# Patient Record
Sex: MALE | Race: WHITE | Employment: OTHER | URBAN - NONMETROPOLITAN AREA
[De-identification: names, ages, dates, MRNs, and addresses within clinical notes are randomized per-mention and may not be internally consistent; named-entity substitution may affect disease eponyms.]

---

## 2017-04-21 ENCOUNTER — OFFICE VISIT (OUTPATIENT)
Dept: PRIMARY CARE CLINIC | Age: 72
End: 2017-04-21
Payer: MEDICARE

## 2017-04-21 VITALS
WEIGHT: 194 LBS | HEIGHT: 70 IN | TEMPERATURE: 97.7 F | SYSTOLIC BLOOD PRESSURE: 122 MMHG | DIASTOLIC BLOOD PRESSURE: 80 MMHG | BODY MASS INDEX: 27.77 KG/M2 | HEART RATE: 68 BPM | OXYGEN SATURATION: 98 %

## 2017-04-21 DIAGNOSIS — R19.7 DIARRHEA, UNSPECIFIED TYPE: Primary | ICD-10-CM

## 2017-04-21 DIAGNOSIS — R19.7 DIARRHEA, UNSPECIFIED TYPE: ICD-10-CM

## 2017-04-21 LAB
ABSOLUTE EOS #: 0.16 K/UL (ref 0–0.4)
ABSOLUTE LYMPH #: 1.24 K/UL (ref 1–4.8)
ABSOLUTE MONO #: 0.55 K/UL (ref 0.1–1.2)
ALBUMIN SERPL-MCNC: 4 G/DL (ref 3.5–5.2)
ALBUMIN/GLOBULIN RATIO: 1.3 (ref 1–2.5)
ALP BLD-CCNC: 71 U/L (ref 40–129)
ALT SERPL-CCNC: 19 U/L (ref 5–41)
ANION GAP SERPL CALCULATED.3IONS-SCNC: 11 MMOL/L (ref 9–17)
AST SERPL-CCNC: 20 U/L
BASOPHILS # BLD: 1 % (ref 0–2)
BASOPHILS ABSOLUTE: 0.04 K/UL (ref 0–0.2)
BILIRUB SERPL-MCNC: 0.39 MG/DL (ref 0.3–1.2)
BUN BLDV-MCNC: 16 MG/DL (ref 8–23)
BUN/CREAT BLD: 18 (ref 9–20)
CALCIUM SERPL-MCNC: 9 MG/DL (ref 8.6–10.4)
CHLORIDE BLD-SCNC: 102 MMOL/L (ref 98–107)
CO2: 26 MMOL/L (ref 20–31)
CREAT SERPL-MCNC: 0.88 MG/DL (ref 0.7–1.2)
DIFFERENTIAL TYPE: ABNORMAL
EOSINOPHILS RELATIVE PERCENT: 4 % (ref 1–4)
GFR AFRICAN AMERICAN: >60 ML/MIN
GFR NON-AFRICAN AMERICAN: >60 ML/MIN
GFR SERPL CREATININE-BSD FRML MDRD: NORMAL ML/MIN/{1.73_M2}
GFR SERPL CREATININE-BSD FRML MDRD: NORMAL ML/MIN/{1.73_M2}
GLUCOSE BLD-MCNC: 91 MG/DL (ref 70–99)
HCT VFR BLD CALC: 45.3 % (ref 41–53)
HEMOGLOBIN: 14.7 G/DL (ref 13.5–17.5)
LYMPHOCYTES # BLD: 28 % (ref 24–44)
MCH RBC QN AUTO: 22.6 PG (ref 26–34)
MCHC RBC AUTO-ENTMCNC: 32.4 G/DL (ref 31–37)
MCV RBC AUTO: 69.8 FL (ref 80–100)
MONOCYTES # BLD: 12 % (ref 1–7)
PDW BLD-RTO: 16.8 % (ref 11–14.5)
PLATELET # BLD: 170 K/UL (ref 140–450)
PLATELET ESTIMATE: ABNORMAL
PMV BLD AUTO: 7.9 FL (ref 6–12)
POTASSIUM SERPL-SCNC: 3.8 MMOL/L (ref 3.7–5.3)
RBC # BLD: 6.49 M/UL (ref 4.5–5.9)
RBC # BLD: ABNORMAL 10*6/UL
SEG NEUTROPHILS: 55 % (ref 36–66)
SEGMENTED NEUTROPHILS ABSOLUTE COUNT: 2.46 K/UL (ref 1.8–7.7)
SODIUM BLD-SCNC: 139 MMOL/L (ref 135–144)
TOTAL PROTEIN: 7 G/DL (ref 6.4–8.3)
WBC # BLD: 4.4 K/UL (ref 3.5–11)
WBC # BLD: ABNORMAL 10*3/UL

## 2017-04-21 PROCEDURE — 85025 COMPLETE CBC W/AUTO DIFF WBC: CPT | Performed by: NURSE PRACTITIONER

## 2017-04-21 PROCEDURE — 3017F COLORECTAL CA SCREEN DOC REV: CPT | Performed by: NURSE PRACTITIONER

## 2017-04-21 PROCEDURE — 1036F TOBACCO NON-USER: CPT | Performed by: NURSE PRACTITIONER

## 2017-04-21 PROCEDURE — 4040F PNEUMOC VAC/ADMIN/RCVD: CPT | Performed by: NURSE PRACTITIONER

## 2017-04-21 PROCEDURE — 99213 OFFICE O/P EST LOW 20 MIN: CPT | Performed by: NURSE PRACTITIONER

## 2017-04-21 PROCEDURE — 1123F ACP DISCUSS/DSCN MKR DOCD: CPT | Performed by: NURSE PRACTITIONER

## 2017-04-21 PROCEDURE — G8420 CALC BMI NORM PARAMETERS: HCPCS | Performed by: NURSE PRACTITIONER

## 2017-04-21 PROCEDURE — 36415 COLL VENOUS BLD VENIPUNCTURE: CPT | Performed by: NURSE PRACTITIONER

## 2017-04-21 PROCEDURE — 80053 COMPREHEN METABOLIC PANEL: CPT | Performed by: NURSE PRACTITIONER

## 2017-04-21 PROCEDURE — G8427 DOCREV CUR MEDS BY ELIG CLIN: HCPCS | Performed by: NURSE PRACTITIONER

## 2017-04-21 ASSESSMENT — ENCOUNTER SYMPTOMS
FLATUS: 1
RESPIRATORY NEGATIVE: 1
BLOOD IN STOOL: 0
ABDOMINAL PAIN: 0
DIARRHEA: 1
NAUSEA: 1

## 2017-04-24 ENCOUNTER — HOSPITAL ENCOUNTER (OUTPATIENT)
Age: 72
Setting detail: SPECIMEN
Discharge: HOME | End: 2017-04-24
Payer: MEDICARE

## 2017-04-25 LAB
CAMPYLOBACTER PCR: NORMAL
DIRECT EXAM: NORMAL
Lab: NORMAL
SALMONELLA PCR: NORMAL
SHIGATOXIN GENE PCR: NORMAL
SHIGELLA SP PCR: NORMAL
SPECIMEN DESCRIPTION: NORMAL
SPECIMEN: NORMAL
STATUS: NORMAL

## 2017-05-01 DIAGNOSIS — E78.2 MIXED HYPERLIPIDEMIA: ICD-10-CM

## 2017-05-04 LAB
CULTURE: NORMAL
Lab: NORMAL
SPECIMEN DESCRIPTION: NORMAL
STATUS: NORMAL

## 2017-05-15 ENCOUNTER — HOSPITAL ENCOUNTER (OUTPATIENT)
Age: 72
Setting detail: SPECIMEN
Discharge: HOME | End: 2017-05-15
Payer: MEDICARE

## 2017-05-19 LAB — VITAMIN K: 1.26 NMOL/L (ref 0.22–4.88)

## 2017-05-24 ENCOUNTER — OFFICE VISIT (OUTPATIENT)
Dept: FAMILY MEDICINE CLINIC | Age: 72
End: 2017-05-24
Payer: MEDICARE

## 2017-05-24 VITALS
SYSTOLIC BLOOD PRESSURE: 112 MMHG | HEIGHT: 70 IN | HEART RATE: 74 BPM | WEIGHT: 193.8 LBS | BODY MASS INDEX: 27.75 KG/M2 | DIASTOLIC BLOOD PRESSURE: 72 MMHG | RESPIRATION RATE: 16 BRPM

## 2017-05-24 DIAGNOSIS — Z85.47 HISTORY OF TESTICULAR CANCER: ICD-10-CM

## 2017-05-24 DIAGNOSIS — R73.01 ELEVATED FASTING GLUCOSE: ICD-10-CM

## 2017-05-24 DIAGNOSIS — E78.2 MIXED HYPERLIPIDEMIA: Primary | ICD-10-CM

## 2017-05-24 DIAGNOSIS — Z23 NEED FOR PROPHYLACTIC VACCINATION AGAINST STREPTOCOCCUS PNEUMONIAE (PNEUMOCOCCUS): ICD-10-CM

## 2017-05-24 PROCEDURE — 3017F COLORECTAL CA SCREEN DOC REV: CPT | Performed by: FAMILY MEDICINE

## 2017-05-24 PROCEDURE — G8427 DOCREV CUR MEDS BY ELIG CLIN: HCPCS | Performed by: FAMILY MEDICINE

## 2017-05-24 PROCEDURE — 1123F ACP DISCUSS/DSCN MKR DOCD: CPT | Performed by: FAMILY MEDICINE

## 2017-05-24 PROCEDURE — 1036F TOBACCO NON-USER: CPT | Performed by: FAMILY MEDICINE

## 2017-05-24 PROCEDURE — 4040F PNEUMOC VAC/ADMIN/RCVD: CPT | Performed by: FAMILY MEDICINE

## 2017-05-24 PROCEDURE — G0009 ADMIN PNEUMOCOCCAL VACCINE: HCPCS | Performed by: FAMILY MEDICINE

## 2017-05-24 PROCEDURE — G8420 CALC BMI NORM PARAMETERS: HCPCS | Performed by: FAMILY MEDICINE

## 2017-05-24 PROCEDURE — 99214 OFFICE O/P EST MOD 30 MIN: CPT | Performed by: FAMILY MEDICINE

## 2017-05-24 PROCEDURE — 90732 PPSV23 VACC 2 YRS+ SUBQ/IM: CPT | Performed by: FAMILY MEDICINE

## 2017-06-07 ENCOUNTER — OFFICE VISIT (OUTPATIENT)
Dept: ONCOLOGY | Age: 72
End: 2017-06-07
Payer: MEDICARE

## 2017-06-07 ENCOUNTER — HOSPITAL ENCOUNTER (OUTPATIENT)
Age: 72
Setting detail: SPECIMEN
Discharge: HOME | End: 2017-06-07
Payer: MEDICARE

## 2017-06-07 VITALS
BODY MASS INDEX: 27.92 KG/M2 | TEMPERATURE: 96.1 F | HEIGHT: 70 IN | HEART RATE: 60 BPM | SYSTOLIC BLOOD PRESSURE: 128 MMHG | WEIGHT: 195 LBS | DIASTOLIC BLOOD PRESSURE: 84 MMHG

## 2017-06-07 DIAGNOSIS — Z85.47 HISTORY OF TESTICULAR CANCER: Primary | ICD-10-CM

## 2017-06-07 LAB — AFP: 3.5 UG/L

## 2017-06-07 PROCEDURE — G8417 CALC BMI ABV UP PARAM F/U: HCPCS | Performed by: INTERNAL MEDICINE

## 2017-06-07 PROCEDURE — 4040F PNEUMOC VAC/ADMIN/RCVD: CPT | Performed by: INTERNAL MEDICINE

## 2017-06-07 PROCEDURE — 1123F ACP DISCUSS/DSCN MKR DOCD: CPT | Performed by: INTERNAL MEDICINE

## 2017-06-07 PROCEDURE — 1036F TOBACCO NON-USER: CPT | Performed by: INTERNAL MEDICINE

## 2017-06-07 PROCEDURE — 99214 OFFICE O/P EST MOD 30 MIN: CPT | Performed by: INTERNAL MEDICINE

## 2017-06-07 PROCEDURE — 3017F COLORECTAL CA SCREEN DOC REV: CPT | Performed by: INTERNAL MEDICINE

## 2017-06-07 PROCEDURE — G8427 DOCREV CUR MEDS BY ELIG CLIN: HCPCS | Performed by: INTERNAL MEDICINE

## 2017-06-07 NOTE — MR AVS SNAPSHOT
After Visit Summary             Agus Dunham   2017 11:20 AM   Office Visit    Description:  Male : 1945   Provider:  Cecilio Higgins MD   Department:  [address] [facility] and Future Appointments         Below is a list of your follow-up and future appointments. This may not be a complete list as you may have made appointments directly with providers that we are not aware of or your providers may have made some for you. Please call your providers to confirm appointments. It is important to keep your appointments. Please bring your current insurance card, photo ID, co-pay, and all medication bottles to your appointment. If self-pay, payment is expected at the time of service. Your To-Do List     Future Appointments Provider Department Dept Phone    2018 9:00 AM Lyudmila Smart MD [place] [address] [phone]    If this is a sports or school physical please bring the physical form with you. Future Orders Complete By Expires    AFP Tumor Marker [WPC165 Custom]  2017    hCG, Quantitative, Pregnancy [VIV526 Custom]  2017    Lactate Dehydrogenase [LAB96 Custom]  2017    XR CHEST STANDARD TWO VW [06435 Custom]  2017         Information from Your Visit        Department     Name Address Phone Fax    [facility] [phone]      You Were Seen for:         Comments    History of testicular cancer   [637189]         Vital Signs     Blood Pressure Pulse Temperature Height Weight Body Mass Index    128/84 60 96.1 °F (35.6 °C) (Tympanic) 5' 10\" (1.778 m) 195 lb (88.5 kg) 27.98 kg/m2    Smoking Status                   Former Smoker           Additional Information about your Body Mass Index (BMI)           Your BMI as listed above is considered overweight (25.0-29.9). BMI is an estimate of body fat, calculated from your height and weight.   The higher your BMI, the greater your risk of heart disease, high blood pressure, type 2 diabetes, stroke, gallstones, arthritis, sleep apnea, and certain cancers. BMI is not perfect. It may overestimate body fat in athletes and people who are more muscular. If your body fat is high you can improve your BMI by decreasing your calorie intake and becoming more physically active. Learn more at: [employer]          Instructions    Chest  X ray  Labs today  RTc 3 years          Medications and Orders      Your Current Medications Are              pitavastatin (LIVALO) 1 MG TABS tablet Take 1 tablet by mouth nightly    Vitamin D (CHOLECALCIFEROL) 1000 UNITS CAPS capsule Take 1,000 Units by mouth daily. Probiotic Product (PROBIOTIC DAILY PO) Take  by mouth. aspirin 81 MG tablet Take 81 mg by mouth daily. Coenzyme Q10 (COQ10) 200 MG CAPS Take  by mouth daily. COD LIVER OIL PO Take  by mouth daily.       Allergies              Lipitor [Atorvastatin] Other (See Comments)    Muscle pain         Additional Information        Basic Information     Date Of Birth Sex Race Ethnicity Preferred Language    1945 Male White Non-/Non  English      Problem List as of 6/7/2017  Date Reviewed: 5/24/2017                Elevated fasting glucose    High risk medication use    Mixed hyperlipidemia    Testicular carcinoma ([facility] [address]    Family history of thalassemia    Essential hypertension      Immunizations as of 6/7/2017     Name Date    Hepatitis B 7/6/2009, 2/4/2009, 1/5/2009    Pneumococcal 13-valent Conjugate ([id number]) 5/6/2015    Pneumococcal Polysaccharide (Nnxdkvcxh15) 5/24/2017, 12/28/2009    Tdap (Boostrix, Adacel) 6/20/2014    Zoster 1/5/2009      Preventive Care        Date Due    Yearly Flu Vaccine (Season Ended) 8/1/2017    Colonoscopy 4/8/2019    Diabetes Screening 5/15/2020    Cholesterol Screening 5/15/2022    Tetanus Combination Vaccine (2 - Td) 6/20/2024

## 2017-06-07 NOTE — PROGRESS NOTES
128/84   Pulse: 60   Temp: 96.1 °F (35.6 °C)     PHYSICAL EXAM:   General appearance - well appearing, no in pain or distress   Mental status - alert and cooperative   Eyes - pupils equal and reactive, extraocular eye movements intact   Ears - bilateral TM's and external ear canals normal   Mouth - mucous membranes moist, pharynx normal without lesions   Neck - supple, no significant adenopathy   Lymphatics - no palpable lymphadenopathy, no hepatosplenomegaly   Chest - clear to auscultation, no wheezes, rales or rhonchi, symmetric air entry   Heart - normal rate, regular rhythm, normal S1, S2, no murmurs, rubs, clicks or gallops   Abdomen - soft, nontender, nondistended, no masses or organomegaly   Neurological - alert, oriented, normal speech, no focal findings or movement disorder noted   Musculoskeletal - no joint tenderness, deformity or swelling   Extremities - peripheral pulses normal, no pedal edema, no clubbing or cyanosis   Skin - normal coloration and turgor, no rashes, no suspicious skin lesions noted ,      LABORATORY DATA:     Lab Results   Component Value Date    WBC 4.4 04/21/2017    HGB 14.7 04/21/2017    HCT 45.3 04/21/2017    MCV 69.8 (L) 04/21/2017     04/21/2017    LYMPHOPCT 28 04/21/2017    RBC 6.49 (H) 04/21/2017    MCH 22.6 (L) 04/21/2017    MCHC 32.4 04/21/2017    RDW 16.8 (H) 04/21/2017    MONOPCT 12 (H) 04/21/2017    BASOPCT 1 04/21/2017    NEUTROABS 2.46 04/21/2017    LYMPHSABS 1.24 04/21/2017    MONOSABS 0.55 04/21/2017    EOSABS 0.16 04/21/2017    BASOSABS 0.04 04/21/2017         Chemistry        Component Value Date/Time     05/15/2017 0852    K 4.1 05/15/2017 0852     05/15/2017 0852    CO2 28 05/15/2017 0852    BUN 15 05/15/2017 0852    CREATININE 0.97 05/15/2017 0852        Component Value Date/Time    CALCIUM 9.2 05/15/2017 0852    ALKPHOS 72 05/15/2017 0852    AST 18 05/15/2017 0852    ALT 17 05/15/2017 0852    BILITOT 0.50 05/15/2017 0852          PATHOLOGY DATA:   Reviewed  IMAGING DATA:    Reviewed  ASSESSMENT:    This is a 78-year-old gentleman with a history of stage IB, testicular carcinoma, seminoma status post left orchiectomy in 2007 at [facility] and since then there is no evidence of recurrence. I expect that based on clinical symptoms, physical examination and recent imaging studies no evidence of recurrence. I recommend him to follow-up with his primary care physician to have routine physical examinations. We will see him every 2-3 years with CBC, CMP, LDH, hCG and alpha-fetoprotein. He will also need chest x-ray every year.     PLAN:   RTC 2 years      Jason Jaramillo MD  Hematology/Oncology

## 2018-05-25 ENCOUNTER — HOSPITAL ENCOUNTER (OUTPATIENT)
Dept: LAB | Age: 73
Setting detail: SPECIMEN
Discharge: HOME | End: 2018-05-25
Payer: MEDICARE

## 2018-05-25 DIAGNOSIS — E78.2 MIXED HYPERLIPIDEMIA: ICD-10-CM

## 2018-05-25 DIAGNOSIS — R73.01 ELEVATED FASTING GLUCOSE: ICD-10-CM

## 2018-05-25 LAB
ALBUMIN SERPL-MCNC: 4 G/DL (ref 3.5–5.2)
ALBUMIN/GLOBULIN RATIO: 1.3 (ref 1–2.5)
ALP BLD-CCNC: 64 U/L (ref 40–129)
ALT SERPL-CCNC: 13 U/L (ref 5–41)
ANION GAP SERPL CALCULATED.3IONS-SCNC: 14 MMOL/L (ref 9–17)
AST SERPL-CCNC: 17 U/L
BILIRUB SERPL-MCNC: 0.32 MG/DL (ref 0.3–1.2)
BUN BLDV-MCNC: 23 MG/DL (ref 8–23)
BUN/CREAT BLD: 18 (ref 9–20)
CALCIUM SERPL-MCNC: 9.6 MG/DL (ref 8.6–10.4)
CHLORIDE BLD-SCNC: 98 MMOL/L (ref 98–107)
CHOLESTEROL/HDL RATIO: 3.2
CHOLESTEROL: 173 MG/DL
CO2: 24 MMOL/L (ref 20–31)
CREAT SERPL-MCNC: 1.27 MG/DL (ref 0.7–1.2)
ESTIMATED AVERAGE GLUCOSE: 120 MG/DL
GFR AFRICAN AMERICAN: >60 ML/MIN
GFR NON-AFRICAN AMERICAN: 56 ML/MIN
GFR SERPL CREATININE-BSD FRML MDRD: ABNORMAL ML/MIN/{1.73_M2}
GFR SERPL CREATININE-BSD FRML MDRD: ABNORMAL ML/MIN/{1.73_M2}
GLUCOSE BLD-MCNC: 91 MG/DL (ref 70–99)
HBA1C MFR BLD: 5.8 % (ref 4.8–5.9)
HDLC SERPL-MCNC: 54 MG/DL
LDL CHOLESTEROL: 106 MG/DL (ref 0–130)
POTASSIUM SERPL-SCNC: 4.5 MMOL/L (ref 3.7–5.3)
SODIUM BLD-SCNC: 136 MMOL/L (ref 135–144)
TOTAL PROTEIN: 7 G/DL (ref 6.4–8.3)
TRIGL SERPL-MCNC: 64 MG/DL
VLDLC SERPL CALC-MCNC: NORMAL MG/DL (ref 1–30)

## 2018-05-25 PROCEDURE — 80053 COMPREHEN METABOLIC PANEL: CPT

## 2018-05-25 PROCEDURE — 80061 LIPID PANEL: CPT

## 2018-05-25 PROCEDURE — 83036 HEMOGLOBIN GLYCOSYLATED A1C: CPT

## 2018-05-25 PROCEDURE — 36415 COLL VENOUS BLD VENIPUNCTURE: CPT

## 2018-05-31 ENCOUNTER — HOSPITAL ENCOUNTER (OUTPATIENT)
Dept: GENERAL RADIOLOGY | Age: 73
Discharge: HOME | End: 2018-06-02
Payer: MEDICARE

## 2018-05-31 ENCOUNTER — OFFICE VISIT (OUTPATIENT)
Dept: FAMILY MEDICINE CLINIC | Age: 73
End: 2018-05-31
Payer: MEDICARE

## 2018-05-31 VITALS
BODY MASS INDEX: 28.23 KG/M2 | SYSTOLIC BLOOD PRESSURE: 124 MMHG | HEIGHT: 70 IN | DIASTOLIC BLOOD PRESSURE: 78 MMHG | HEART RATE: 84 BPM | WEIGHT: 197.2 LBS | RESPIRATION RATE: 16 BRPM

## 2018-05-31 DIAGNOSIS — Z85.47 HISTORY OF TESTICULAR CANCER: ICD-10-CM

## 2018-05-31 DIAGNOSIS — Z13.6 SCREENING FOR AAA (ABDOMINAL AORTIC ANEURYSM): ICD-10-CM

## 2018-05-31 DIAGNOSIS — R79.89 ELEVATED SERUM CREATININE: ICD-10-CM

## 2018-05-31 DIAGNOSIS — Z23 NEED FOR SHINGLES VACCINE: ICD-10-CM

## 2018-05-31 DIAGNOSIS — Z12.5 PROSTATE CANCER SCREENING: ICD-10-CM

## 2018-05-31 DIAGNOSIS — E78.2 MIXED HYPERLIPIDEMIA: Primary | ICD-10-CM

## 2018-05-31 DIAGNOSIS — S76.212A GROIN STRAIN, LEFT, INITIAL ENCOUNTER: ICD-10-CM

## 2018-05-31 PROCEDURE — 4040F PNEUMOC VAC/ADMIN/RCVD: CPT | Performed by: FAMILY MEDICINE

## 2018-05-31 PROCEDURE — 1123F ACP DISCUSS/DSCN MKR DOCD: CPT | Performed by: FAMILY MEDICINE

## 2018-05-31 PROCEDURE — G8427 DOCREV CUR MEDS BY ELIG CLIN: HCPCS | Performed by: FAMILY MEDICINE

## 2018-05-31 PROCEDURE — G8417 CALC BMI ABV UP PARAM F/U: HCPCS | Performed by: FAMILY MEDICINE

## 2018-05-31 PROCEDURE — 3017F COLORECTAL CA SCREEN DOC REV: CPT | Performed by: FAMILY MEDICINE

## 2018-05-31 PROCEDURE — 71046 X-RAY EXAM CHEST 2 VIEWS: CPT

## 2018-05-31 PROCEDURE — 99214 OFFICE O/P EST MOD 30 MIN: CPT | Performed by: FAMILY MEDICINE

## 2018-05-31 PROCEDURE — 1036F TOBACCO NON-USER: CPT | Performed by: FAMILY MEDICINE

## 2018-05-31 ASSESSMENT — PATIENT HEALTH QUESTIONNAIRE - PHQ9
SUM OF ALL RESPONSES TO PHQ QUESTIONS 1-9: 0
SUM OF ALL RESPONSES TO PHQ9 QUESTIONS 1 & 2: 0
1. LITTLE INTEREST OR PLEASURE IN DOING THINGS: 0
2. FEELING DOWN, DEPRESSED OR HOPELESS: 0

## 2018-06-08 ENCOUNTER — HOSPITAL ENCOUNTER (OUTPATIENT)
Dept: INTERVENTIONAL RADIOLOGY/VASCULAR | Age: 73
Discharge: HOME | End: 2018-06-10
Payer: MEDICARE

## 2018-06-08 DIAGNOSIS — Z13.6 SCREENING FOR AAA (ABDOMINAL AORTIC ANEURYSM): ICD-10-CM

## 2018-06-08 PROCEDURE — 76775 US EXAM ABDO BACK WALL LIM: CPT

## 2018-06-15 ENCOUNTER — HOSPITAL ENCOUNTER (OUTPATIENT)
Dept: LAB | Age: 73
Setting detail: SPECIMEN
Discharge: HOME | End: 2018-06-15
Payer: MEDICARE

## 2018-06-15 DIAGNOSIS — R79.89 ELEVATED SERUM CREATININE: Primary | ICD-10-CM

## 2018-06-15 DIAGNOSIS — E78.2 MIXED HYPERLIPIDEMIA: ICD-10-CM

## 2018-06-15 DIAGNOSIS — Z12.5 PROSTATE CANCER SCREENING: ICD-10-CM

## 2018-06-15 DIAGNOSIS — R79.89 ELEVATED SERUM CREATININE: ICD-10-CM

## 2018-06-15 LAB
ANION GAP SERPL CALCULATED.3IONS-SCNC: 10 MMOL/L (ref 9–17)
BUN BLDV-MCNC: 16 MG/DL (ref 8–23)
BUN/CREAT BLD: 13 (ref 9–20)
CALCIUM SERPL-MCNC: 9.3 MG/DL (ref 8.6–10.4)
CHLORIDE BLD-SCNC: 102 MMOL/L (ref 98–107)
CO2: 26 MMOL/L (ref 20–31)
CREAT SERPL-MCNC: 1.22 MG/DL (ref 0.7–1.2)
GFR AFRICAN AMERICAN: >60 ML/MIN
GFR NON-AFRICAN AMERICAN: 59 ML/MIN
GFR SERPL CREATININE-BSD FRML MDRD: ABNORMAL ML/MIN/{1.73_M2}
GFR SERPL CREATININE-BSD FRML MDRD: ABNORMAL ML/MIN/{1.73_M2}
GLUCOSE BLD-MCNC: 108 MG/DL (ref 70–99)
POTASSIUM SERPL-SCNC: 4.4 MMOL/L (ref 3.7–5.3)
PROSTATE SPECIFIC ANTIGEN: 1.42 UG/L
SODIUM BLD-SCNC: 138 MMOL/L (ref 135–144)

## 2018-06-15 PROCEDURE — G0103 PSA SCREENING: HCPCS

## 2018-06-15 PROCEDURE — 36415 COLL VENOUS BLD VENIPUNCTURE: CPT

## 2018-06-15 PROCEDURE — 80048 BASIC METABOLIC PNL TOTAL CA: CPT

## 2018-06-18 RX ORDER — PITAVASTATIN CALCIUM 1.04 MG/1
TABLET, FILM COATED ORAL
Qty: 30 TABLET | Refills: 12 | OUTPATIENT
Start: 2018-06-18

## 2018-07-11 ENCOUNTER — TELEPHONE (OUTPATIENT)
Dept: FAMILY MEDICINE CLINIC | Age: 73
End: 2018-07-11

## 2018-07-11 DIAGNOSIS — E78.2 MIXED HYPERLIPIDEMIA: Primary | ICD-10-CM

## 2018-08-20 ENCOUNTER — PATIENT MESSAGE (OUTPATIENT)
Dept: FAMILY MEDICINE CLINIC | Age: 73
End: 2018-08-20

## 2018-08-20 DIAGNOSIS — E66.3 OVERWEIGHT: Primary | ICD-10-CM

## 2018-08-20 NOTE — TELEPHONE ENCOUNTER
From: Lulu Rawls  To: Cortez Cardoso MD  Sent: 8/20/2018 12:30 PM EDT  Subject: Non-Urgent Medical Question    Dr. Arin Ratliff,  I would like to go on the prolon diet while I stay on taking satins. I will be getting blood test in Sept. to see if my kidney s get back to normal. I was only able to take the satins with ibuprofen, I have stopped. I am able to take the prescribed dosage one every three days. I would follow this protocall. The reason for the request is that you would have to go on line and get a code so I can purchase the program material. I would stay on the satin every three days. Get blood tested one a month for three months at which time sit down with you and go over results and next steps. Please advice if you are ok with this plan. The web site is ProLonpro[employer]com. I would be doing a 5 day fast with the food supplied once each month.   Charlotte Beaver

## 2018-08-29 ENCOUNTER — OFFICE VISIT (OUTPATIENT)
Dept: NUTRITION | Age: 73
End: 2018-08-29

## 2018-08-29 DIAGNOSIS — I10 ESSENTIAL HYPERTENSION: Primary | ICD-10-CM

## 2018-08-29 DIAGNOSIS — E66.3 OVERWEIGHT (BMI 25.0-29.9): ICD-10-CM

## 2018-08-29 DIAGNOSIS — E78.2 MIXED HYPERLIPIDEMIA: ICD-10-CM

## 2018-08-29 PROCEDURE — 99999 PR OFFICE/OUTPT VISIT,PROCEDURE ONLY: CPT | Performed by: DIETITIAN, REGISTERED

## 2018-09-05 ENCOUNTER — HOSPITAL ENCOUNTER (OUTPATIENT)
Dept: LAB | Age: 73
Setting detail: SPECIMEN
Discharge: HOME | End: 2018-09-05
Payer: MEDICARE

## 2018-09-05 DIAGNOSIS — R79.89 ELEVATED SERUM CREATININE: ICD-10-CM

## 2018-09-05 DIAGNOSIS — E78.2 MIXED HYPERLIPIDEMIA: ICD-10-CM

## 2018-09-05 LAB
ANION GAP SERPL CALCULATED.3IONS-SCNC: 12 MMOL/L (ref 9–17)
BUN BLDV-MCNC: 13 MG/DL (ref 8–23)
BUN/CREAT BLD: 11 (ref 9–20)
CALCIUM SERPL-MCNC: 9.9 MG/DL (ref 8.6–10.4)
CHLORIDE BLD-SCNC: 100 MMOL/L (ref 98–107)
CHOLESTEROL/HDL RATIO: 3.8
CHOLESTEROL: 191 MG/DL
CO2: 27 MMOL/L (ref 20–31)
CREAT SERPL-MCNC: 1.15 MG/DL (ref 0.7–1.2)
GFR AFRICAN AMERICAN: >60 ML/MIN
GFR NON-AFRICAN AMERICAN: >60 ML/MIN
GFR SERPL CREATININE-BSD FRML MDRD: ABNORMAL ML/MIN/{1.73_M2}
GFR SERPL CREATININE-BSD FRML MDRD: ABNORMAL ML/MIN/{1.73_M2}
GLUCOSE FASTING: 108 MG/DL (ref 70–99)
HDLC SERPL-MCNC: 50 MG/DL
LDL CHOLESTEROL: 120 MG/DL (ref 0–130)
POTASSIUM SERPL-SCNC: 4.4 MMOL/L (ref 3.7–5.3)
SODIUM BLD-SCNC: 139 MMOL/L (ref 135–144)
TRIGL SERPL-MCNC: 107 MG/DL
VLDLC SERPL CALC-MCNC: NORMAL MG/DL (ref 1–30)

## 2018-09-05 PROCEDURE — 36415 COLL VENOUS BLD VENIPUNCTURE: CPT

## 2018-09-05 PROCEDURE — 80061 LIPID PANEL: CPT

## 2018-09-05 PROCEDURE — 80048 BASIC METABOLIC PNL TOTAL CA: CPT

## 2018-09-12 DIAGNOSIS — R73.01 ELEVATED FASTING GLUCOSE: ICD-10-CM

## 2018-09-12 DIAGNOSIS — E78.2 MIXED HYPERLIPIDEMIA: Primary | ICD-10-CM

## 2018-09-12 DIAGNOSIS — I10 ESSENTIAL HYPERTENSION: ICD-10-CM

## 2018-10-03 ENCOUNTER — OFFICE VISIT (OUTPATIENT)
Dept: PRIMARY CARE CLINIC | Age: 73
End: 2018-10-03
Payer: MEDICARE

## 2018-10-03 VITALS
SYSTOLIC BLOOD PRESSURE: 110 MMHG | BODY MASS INDEX: 28.2 KG/M2 | HEIGHT: 70 IN | TEMPERATURE: 97.8 F | HEART RATE: 70 BPM | WEIGHT: 197 LBS | DIASTOLIC BLOOD PRESSURE: 62 MMHG

## 2018-10-03 DIAGNOSIS — H00.14 CHALAZION LEFT UPPER EYELID: Primary | ICD-10-CM

## 2018-10-03 PROCEDURE — 99213 OFFICE O/P EST LOW 20 MIN: CPT | Performed by: NURSE PRACTITIONER

## 2018-10-03 PROCEDURE — 1101F PT FALLS ASSESS-DOCD LE1/YR: CPT | Performed by: NURSE PRACTITIONER

## 2018-10-03 PROCEDURE — G8484 FLU IMMUNIZE NO ADMIN: HCPCS | Performed by: NURSE PRACTITIONER

## 2018-10-03 PROCEDURE — 3017F COLORECTAL CA SCREEN DOC REV: CPT | Performed by: NURSE PRACTITIONER

## 2018-10-03 PROCEDURE — 1036F TOBACCO NON-USER: CPT | Performed by: NURSE PRACTITIONER

## 2018-10-03 PROCEDURE — 1123F ACP DISCUSS/DSCN MKR DOCD: CPT | Performed by: NURSE PRACTITIONER

## 2018-10-03 PROCEDURE — 4040F PNEUMOC VAC/ADMIN/RCVD: CPT | Performed by: NURSE PRACTITIONER

## 2018-10-03 PROCEDURE — G8417 CALC BMI ABV UP PARAM F/U: HCPCS | Performed by: NURSE PRACTITIONER

## 2018-10-03 PROCEDURE — G8427 DOCREV CUR MEDS BY ELIG CLIN: HCPCS | Performed by: NURSE PRACTITIONER

## 2018-10-03 RX ORDER — BACITRACIN 500 [USP'U]/G
OINTMENT OPHTHALMIC 3 TIMES DAILY
Qty: 1 TUBE | Refills: 0 | Status: SHIPPED | OUTPATIENT
Start: 2018-10-03 | End: 2018-10-13

## 2018-10-03 ASSESSMENT — ENCOUNTER SYMPTOMS
EYE ITCHING: 1
BLURRED VISION: 0
EYE DISCHARGE: 0
DOUBLE VISION: 0
EYE PAIN: 0
PHOTOPHOBIA: 0
EYE REDNESS: 0
RESPIRATORY NEGATIVE: 1

## 2018-10-03 NOTE — PATIENT INSTRUCTIONS
Patient Education        Styes and Chalazia: Care Instructions  Your Care Instructions    Styes and chalazia (say \"ulc-IZT-mnl-uh\") are both conditions that can cause swelling of the eyelid. A stye is an infection in the root of an eyelash. The infection causes a tender red lump on the edge of the eyelid. The infection can spread until the whole eyelid becomes red and inflamed. Styes usually break open, and a tiny amount of pus drains. They usually clear up on their own in about a week, but they sometimes need treatment with antibiotics. A chalazion is a lump or cyst in the eyelid (chalazion is singular; chalazia is plural). It is caused by swelling and inflammation of deep oil glands inside the eyelid. Chalazia are usually not infected. They can take a few months to heal.  If a chalazion becomes more swollen and painful or does not go away, you may need to have it drained by your doctor. Follow-up care is a key part of your treatment and safety. Be sure to make and go to all appointments, and call your doctor if you are having problems. It's also a good idea to know your test results and keep a list of the medicines you take. How can you care for yourself at home? · Do not rub your eyes. Do not squeeze or try to open a stye or chalazion. · To help a stye or chalazion heal faster:  ¨ Put a warm, moist compress on your eye for 5 to 10 minutes, 3 to 6 times a day. Heat often brings a stye to a point where it drains on its own. Keep in mind that warm compresses will often increase swelling a little at first.  ¨ Do not use hot water or heat a wet cloth in a microwave oven. The compress may get too hot and can burn the eyelid. · Always wash your hands before and after you use a compress or touch your eyes. · If the doctor gave you antibiotic drops or ointment, use the medicine exactly as directed. Use the medicine for as long as instructed, even if your eye starts to feel better.   · To put in eyedrops or

## 2018-12-10 ENCOUNTER — HOSPITAL ENCOUNTER (OUTPATIENT)
Dept: LAB | Age: 73
Discharge: HOME | End: 2018-12-10
Payer: MEDICARE

## 2018-12-10 DIAGNOSIS — I10 ESSENTIAL HYPERTENSION: ICD-10-CM

## 2018-12-10 DIAGNOSIS — R73.01 ELEVATED FASTING GLUCOSE: ICD-10-CM

## 2018-12-10 DIAGNOSIS — E78.2 MIXED HYPERLIPIDEMIA: ICD-10-CM

## 2018-12-10 LAB
ALBUMIN SERPL-MCNC: 4.4 G/DL (ref 3.5–5.2)
ALBUMIN/GLOBULIN RATIO: 1.4 (ref 1–2.5)
ALP BLD-CCNC: 80 U/L (ref 40–129)
ALT SERPL-CCNC: 12 U/L (ref 5–41)
ANION GAP SERPL CALCULATED.3IONS-SCNC: 12 MMOL/L (ref 9–17)
AST SERPL-CCNC: 18 U/L
BILIRUB SERPL-MCNC: 0.56 MG/DL (ref 0.3–1.2)
BUN BLDV-MCNC: 15 MG/DL (ref 8–23)
BUN/CREAT BLD: 14 (ref 9–20)
CALCIUM SERPL-MCNC: 9.6 MG/DL (ref 8.6–10.4)
CHLORIDE BLD-SCNC: 101 MMOL/L (ref 98–107)
CHOLESTEROL, FASTING: 231 MG/DL
CHOLESTEROL/HDL RATIO: 4.4
CO2: 25 MMOL/L (ref 20–31)
CREAT SERPL-MCNC: 1.08 MG/DL (ref 0.7–1.2)
GFR AFRICAN AMERICAN: >60 ML/MIN
GFR NON-AFRICAN AMERICAN: >60 ML/MIN
GFR SERPL CREATININE-BSD FRML MDRD: ABNORMAL ML/MIN/{1.73_M2}
GFR SERPL CREATININE-BSD FRML MDRD: ABNORMAL ML/MIN/{1.73_M2}
GLUCOSE FASTING: 104 MG/DL (ref 70–99)
HDLC SERPL-MCNC: 52 MG/DL
LDL CHOLESTEROL: 154 MG/DL (ref 0–130)
POTASSIUM SERPL-SCNC: 4.5 MMOL/L (ref 3.7–5.3)
SODIUM BLD-SCNC: 138 MMOL/L (ref 135–144)
TOTAL PROTEIN: 7.5 G/DL (ref 6.4–8.3)
TRIGLYCERIDE, FASTING: 127 MG/DL
VLDLC SERPL CALC-MCNC: ABNORMAL MG/DL (ref 1–30)

## 2018-12-10 PROCEDURE — 36415 COLL VENOUS BLD VENIPUNCTURE: CPT

## 2018-12-10 PROCEDURE — 80061 LIPID PANEL: CPT

## 2018-12-10 PROCEDURE — 80053 COMPREHEN METABOLIC PANEL: CPT

## 2018-12-13 DIAGNOSIS — I10 ESSENTIAL HYPERTENSION: Primary | ICD-10-CM

## 2018-12-13 DIAGNOSIS — E78.2 MIXED HYPERLIPIDEMIA: ICD-10-CM

## 2018-12-13 DIAGNOSIS — R73.01 ELEVATED FASTING GLUCOSE: ICD-10-CM

## 2019-04-09 ENCOUNTER — TELEPHONE (OUTPATIENT)
Dept: FAMILY MEDICINE CLINIC | Age: 74
End: 2019-04-09

## 2019-04-09 DIAGNOSIS — Z85.47 HISTORY OF TESTICULAR CANCER: ICD-10-CM

## 2019-04-09 DIAGNOSIS — M25.561 RIGHT KNEE PAIN, UNSPECIFIED CHRONICITY: Primary | ICD-10-CM

## 2019-04-15 ENCOUNTER — HOSPITAL ENCOUNTER (OUTPATIENT)
Dept: PHYSICAL THERAPY | Age: 74
Setting detail: THERAPIES SERIES
Discharge: HOME | End: 2019-04-15
Payer: MEDICARE

## 2019-04-15 PROCEDURE — 97110 THERAPEUTIC EXERCISES: CPT | Performed by: PHYSICAL THERAPIST

## 2019-04-15 PROCEDURE — 97161 PT EVAL LOW COMPLEX 20 MIN: CPT | Performed by: PHYSICAL THERAPIST

## 2019-04-15 ASSESSMENT — PAIN DESCRIPTION - PAIN TYPE: TYPE: ACUTE PAIN

## 2019-04-15 ASSESSMENT — PAIN DESCRIPTION - LOCATION: LOCATION: KNEE

## 2019-04-15 ASSESSMENT — PAIN SCALES - GENERAL: PAINLEVEL_OUTOF10: 10

## 2019-04-15 ASSESSMENT — PAIN DESCRIPTION - DESCRIPTORS: DESCRIPTORS: SHARP

## 2019-04-15 ASSESSMENT — PAIN DESCRIPTION - PROGRESSION: CLINICAL_PROGRESSION: GRADUALLY WORSENING

## 2019-04-15 ASSESSMENT — PAIN DESCRIPTION - ORIENTATION: ORIENTATION: RIGHT

## 2019-04-15 ASSESSMENT — PAIN DESCRIPTION - FREQUENCY: FREQUENCY: INTERMITTENT

## 2019-04-15 ASSESSMENT — PAIN DESCRIPTION - ONSET: ONSET: PROGRESSIVE

## 2019-04-15 ASSESSMENT — PAIN - FUNCTIONAL ASSESSMENT: PAIN_FUNCTIONAL_ASSESSMENT: PREVENTS OR INTERFERES SOME ACTIVE ACTIVITIES AND ADLS

## 2019-04-15 NOTE — PROGRESS NOTES
Physical Therapy  Initial Assessment  Date: 4/15/2019  Patient Name: Shavonne Mccollum  MRN: [id number]  : 1945     Treatment Diagnosis: M25.561 R knee pain    Restrictions-none       Subjective   General  Chart Reviewed: Yes  Patient assessed for rehabilitation services?: Yes  Response To Previous Treatment: Not applicable  Family / Caregiver Present: No  Referring Practitioner: Aleena  Referral Date : 19  Diagnosis: M25.561 R knee pain  Follows Commands: Within Functional Limits  PT Visit Information  Onset Date: 19  PT Insurance Information: Medicare  Subjective  Subjective: R knee pain started about 3 weeks ago. No known injury. Pain with prolonged sitting, up steps.   Pain Screening  Patient Currently in Pain: Yes  Pain Assessment  Pain Assessment: 0-10  Pain Level: 10(on steps)  Patient's Stated Pain Goal: 1  Pain Type: Acute pain  Pain Location: Knee  Pain Orientation: Right  Pain Radiating Towards: Anterior/ medial knee  Pain Descriptors: Sharp(on steps)  Pain Frequency: Intermittent  Pain Onset: Progressive  Clinical Progression: Gradually worsening  Functional Pain Assessment: Prevents or interferes some active activities and ADLs  Vital Signs  Patient Currently in Pain: Yes    Orientation  Orientation  Overall Orientation Status: Within Normal Limits    Social/Functional History  Social/Functional History  Type of Home: House  Home Layout: One level  Home Access: Stairs to enter with rails  Entrance Stairs - Number of Steps: 3  Bathroom Accessibility: Accessible  ADL Assistance: Independent  Homemaking Assistance: Independent  Ambulation Assistance: Independent  Transfer Assistance: Independent  Active : Yes  Mode of Transportation: Car  Occupation: Retired    Objective     Observation/Palpation  Posture: Good    PROM RLE (degrees)  R Knee Flexion 0-145: 140  R Knee Extension 0: +2  AROM RLE (degrees)  R Knee Flexion 0-145: 140  R Knee Extension 0: 0    Strength RLE  Comment: + patellar crepitus  R Hip Flexion: 5/5  R Hip Extension: 5/5  R Hip ABduction: 5/5  R Knee Flexion: 5/5  R Knee Extension: 5/5     Additional Measures  Flexibility: HS popliteal angle 20 deg  Special Tests: + Obers for ITB tension & Pat-fem joint pain  Other: Negative ligament instability. Negative McMurrays & Apley(+ Pain 8\" step up)           Assessment   Conditions Requiring Skilled Therapeutic Intervention  Body structures, Functions, Activity limitations: Decreased ROM; Decreased strength  Assessment: P patello-femoral joint pain origin  Treatment Diagnosis: M25.561 R knee pain  Prognosis: Good  Decision Making: Low Complexity  REQUIRES PT FOLLOW UP: Yes  Activity Tolerance  Activity Tolerance: Patient Tolerated treatment well         Plan   Plan  Times per week: 2-3  Current Treatment Recommendations: Strengthening, ROM, Manual Therapy - Joint Manipulation, Home Exercise Program, Modalities    G-Code  PT G-Codes  Functional Assessment Tool Used: LEFS  Score: 59/80 = 74%, or 26% disability    Goals  Short term goals  Time Frame for Short term goals: 1 week  Short term goal 1: Start HEP  Long term goals  Time Frame for Long term goals : 4 weeks  Long term goal 1: R knee pain 2/10 thru the day  Long term goal 2: Up/ down 8\" step at 2/10 pain  Long term goal 3: Sit & drive 1 hr   Long term goal 4: Continue his active wellness regimine       Therapy Time   Individual Concurrent Group Co-treatment   Time In 0800         Time Out 0840         Minutes 40                 Ritika Patel, PT

## 2019-04-15 NOTE — PLAN OF CARE
[facility]    [x] [place]  Phone: [phone]  Fax: [phone]      [] [place]  Phone: [phone]  Fax: [phone]        To: Referring Practitioner: Aleena      Patient: Ronit Monique  : 1945   MRN: [id number]  Evaluation Date: 4/15/2019      Diagnosis Information:  · Diagnosis: M25.561 R knee pain   · Treatment Diagnosis: M25.561 R knee pain     Physical Therapy Certification Form  Dear Jasmina Whiting  The following patient has been evaluated for physical therapy services and for therapy to continue, Medicare requires monthly physician review of the treatment plan. Please review the attached evaluation and/or summary of the patient's plan of care, and verify that you agree therapy should continue by signing the attached document and sending it back to our office. Plan of Care/Treatment to date:  [x] Therapeutic Exercise    [] Modalities:  [] Therapeutic Activity     [x] Ultrasound  [x] Electrical Stimulation  [] Gait Training      [] Cervical Traction [] Lumbar Traction  [] Neuromuscular Re-education    [] Cold/hotpack [x] Iontophoresis   [x] Instruction in HEP     Other:  [x] Manual Therapy      []             [] Aquatic Therapy      []           ? Goals:  Short term goals  Time Frame for Short term goals: 1 week  Short term goal 1: Start HEP    Long term goals  Time Frame for Long term goals : 4 weeks  Long term goal 1: R knee pain 2/10 thru the day  Long term goal 2: Up/ down 8\" step at 2/10 pain  Long term goal 3: Sit & drive 1 hr   Long term goal 4: Continue his active wellness regimine    Frequency/Duration: 4/15/19 - 19  # Days per week: [] 1 day # Weeks: [] 1 week [] 5 weeks     [] 2 days?    [] 2 weeks [] 6 weeks     [x] 3 days   [] 3 weeks [] 7 weeks     [] 4 days   [x] 4 weeks [] 8 weeks    Rehab Potential: [] Excellent [x] Good [] Fair  [] Poor     Electronically signed by:  Karen Pak PT      If you have any questions or concerns, please don't hesitate to call.   Thank you for your referral.      Physician Signature:________________________________Date:__________________  By signing above, therapists plan is approved by physician

## 2019-04-16 ENCOUNTER — HOSPITAL ENCOUNTER (OUTPATIENT)
Dept: PHYSICAL THERAPY | Age: 74
Setting detail: THERAPIES SERIES
Discharge: HOME | End: 2019-04-16
Payer: MEDICARE

## 2019-04-16 PROCEDURE — 97035 APP MDLTY 1+ULTRASOUND EA 15: CPT

## 2019-04-16 PROCEDURE — 97110 THERAPEUTIC EXERCISES: CPT

## 2019-04-16 NOTE — FLOWSHEET NOTE
Physical Therapy Daily Treatment Note    Date:  2019    Patient Name:  Melissa Pichardo   \"Oneil\"  :  1945  MRN: [id number]  Restrictions/Precautions:     Medical/Treatment Diagnosis Information:   · Diagnosis: M25.561 R knee pain  · Treatment Diagnosis: M25.561 R knee pain  Insurance/Certification information:  PT Insurance Information: Medicare  Physician Information:  Referring Practitioner: [address] [place] [address],  [address] of care signed (Y/N):  Yes  Visit# / total visits:  10  Pain level: 0/10     Functional Assessment Tool Used: LEFS  Score: 59/80 = 74%, or 26% disability    Time In: 10:34   Time Out: 11:14    Progress Note: []  Yes  [x]  No  Next due by: Visit #10, or 19      Subjective:  Feels terrific today. Tape has helped and is compliant with HEP. Minimal discomfort with stairs noted this date. Objective: Patient tolerated treatment. Increased discomfort noted with squat matrix (narrow offset feet both directions). Symptoms resolved upon completion. Performed US to Right medial pat-fem joint for tissue repair. Observation:  Presents in open toe shoes, unfit for higher level activities.   Test measurements:      Exercises: there ex for flexibility, strength, and pat-fem joint mechanics  Exercise/Equipment Resistance/Repetitions Other comments   HS stretch 10x    ITB stretch 10x    PKF     Schneider tape 5'    TKE 20x blue    HS curl 20x blue    Squat Matrix 10x, 9-positions Progress to all positions   Step up 4\" x10ea Fwd/lat/retro   Resisted lateral walk grn 2 laps     Monster walk grn 2 laps    4 way hip Red, x15         TM retro     US 8'    [x] Provided verbal/tactile cueing for activities related to strengthening, flexibility, endurance, ROM. (49071)  [] Provided verbal/tactile cueing for activities related to improving balance, coordination, kinesthetic sense, posture, motor skill, proprioception. (30045)    Therapeutic Activities:     [] Therapeutic activities, direct (one-on-one) patient contact (use of dynamic activities to improve functional performance). (91254)    Gait:   [] Provided training and instruction to the patient for ambulation re-education. (68672)    Self-Care/ADL's  [] Self-care/home management training and compensatory training, meal preparation, safety procedures, and instructions in use of assistive technology devices/adaptive equipment, direct one-on-one contact. (27487)    Home Exercise Program:  T-band TKE, HS curl, lateral walk, monster walk   [x] Reviewed/Progressed HEP activities related to strengthening, flexibility, endurance, ROM. (49499)  [] Reviewed/Progressed HEP activities related to improving balance, coordination, kinesthetic sense, posture, motor skill, proprioception.  (45379)    Manual Treatments:    [] Provided manual therapy to mobilize soft tissue/joints for the purpose of modulating pain, promoting relaxation,  increasing ROM, reducing/eliminating soft tissue swelling/inflammation/restriction, improving soft tissue extensibility. (10655)    Service Based Modalities:   x8' to right medial pat-fem joint, 3.3mHz, 1.0W/cm2 for tissue repair    Timed Code Treatment Minutes:     There ex/ HEP 32'    Total Treatment Minutes:   36'    Treatment/Activity Tolerance:  [x] Patient tolerated treatment well [] Patient limited by fatique  [] Patient limited by pain  [] Patient limited by other medical complications  [] Other:     Prognosis: [x] Good [] Fair  [] Poor    Patient Requires Follow-up: [x] Yes  [] No      Goals:  Short term goals  Time Frame for Short term goals: 1 week  Short term goal 1: Start HEP Initiated HEP 4/15/19    Long term goals  Time Frame for Long term goals : 4 weeks  Long term goal 1: R knee pain 2/10 thru the day  Long term goal 2: Up/ down 8\" step at 2/10 pain  Long term goal 3: Sit & drive 1 hr   Long term goal 4: Continue his active wellness regimine          Plan:   [x] Continue per plan of care [] Alter current plan (see comments)  [] Plan of care initiated [] Hold pending MD visit [] Discharge  Plan for Next Session: Continue to advance knee stability without pain.      Electronically signed by:  Tressa Dickinson

## 2019-04-19 ENCOUNTER — HOSPITAL ENCOUNTER (OUTPATIENT)
Dept: PHYSICAL THERAPY | Age: 74
Setting detail: THERAPIES SERIES
Discharge: HOME | End: 2019-04-19
Payer: MEDICARE

## 2019-04-19 PROCEDURE — 97110 THERAPEUTIC EXERCISES: CPT

## 2019-04-19 NOTE — FLOWSHEET NOTE
Physical Therapy Daily Treatment Note    Date:  2019    Patient Name:  Asia Cai   \"Oneil\"  :  1945  MRN: [id number]  Restrictions/Precautions:     Medical/Treatment Diagnosis Information:   · Diagnosis: M25.561 R knee pain  · Treatment Diagnosis: M25.561 R knee pain  Insurance/Certification information:  PT Insurance Information: Medicare  Physician Information:  Referring Practitioner: Aleena  Plan of care signed (Y/N):  Yes  Visit# / total visits:  3/10  Pain level: 0/10     Functional Assessment Tool Used: LEFS  Score: 59/80 = 74%, or 26% disability (Scored 77/80=4% disability. 19APR)    Time In: 8:00   Time Out: 8:46    Progress Note: []  Yes  [x]  No  Next due by: Visit #10, or 19      Subjective: Pt rpts to clinic with no complaints of R knee pain stating \"Its to the point where I do not have to think about bending that knee or doing heavy work with it. It feels great\". Objective: Pt tolerated todays session well indicating only minimal amounts of pain noted with squats. Pt was able to perform all SYLVIA per flow chart to increase LE strength and stability with no vc required for proper performance. Pt wanted to be placed on hold this date as pain is minimal and pt is back to doing heavy lifting and all home activities without issues. Responded well to US application and McConnel taping this date. Instructed to f/u with PCP after vacation with understanding noted. Observation: Able to perform all SYLVIA with sandals donned.    Test measurements:      Exercises: there ex for flexibility, strength, and pat-fem joint mechanics  Exercise/Equipment Resistance/Repetitions Other comments   HS stretch 10x    ITB stretch 10x    PKF     Schneider tape 5'    TKE 20x blue    HS curl 20x blue    Squat Matrix 10x, 9-positions Progress to all positions   Step up 4\" x10ea Fwd/lat/retro   Resisted lateral walk grn 2 laps     Monster walk grn 2 laps    4 way hip grn, x15    4-way resisted walking with Area 52 Gamesbex 5 laps ea 6 plates   TM retro     US 8'    [x] Provided verbal/tactile cueing for activities related to strengthening, flexibility, endurance, ROM. (09036)  [] Provided verbal/tactile cueing for activities related to improving balance, coordination, kinesthetic sense, posture, motor skill, proprioception. (03093)    Therapeutic Activities:     [] Therapeutic activities, direct (one-on-one) patient contact (use of dynamic activities to improve functional performance). (93212)    Gait:   [] Provided training and instruction to the patient for ambulation re-education. (98829)    Self-Care/ADL's  [] Self-care/home management training and compensatory training, meal preparation, safety procedures, and instructions in use of assistive technology devices/adaptive equipment, direct one-on-one contact. (44016)    Home Exercise Program:  T-band TKE, HS curl, lateral walk, monster walk   [x] Reviewed/Progressed HEP activities related to strengthening, flexibility, endurance, ROM. (04124)  [] Reviewed/Progressed HEP activities related to improving balance, coordination, kinesthetic sense, posture, motor skill, proprioception.  (43671)    Manual Treatments:    [] Provided manual therapy to mobilize soft tissue/joints for the purpose of modulating pain, promoting relaxation,  increasing ROM, reducing/eliminating soft tissue swelling/inflammation/restriction, improving soft tissue extensibility. (62321)    Service Based Modalities:  US x8' to right medial pat-fem joint, 3.3mHz, 1.0W/cm2 for tissue repair    Timed Code Treatment Minutes:     There ex 38'    Total Treatment Minutes:   55'    Treatment/Activity Tolerance:  [x] Patient tolerated treatment well [] Patient limited by fatique  [] Patient limited by pain  [] Patient limited by other medical complications  [] Other:     Prognosis: [x] Good [] Fair  [] Poor    Patient Requires Follow-up: [x] Yes  [] No      Goals:  Short term goals  Time Frame for Short term goals: 1 week  Short term goal 1: Start HEP Initiated HEP 4/15/19    Long term goals  Time Frame for Long term goals : 4 weeks  Long term goal 1: R knee pain 2/10 thru the day (Pt rpts with 0/10 pain. rpts at most 2/10 pain with activity. 19APR)  Long term goal 2: Up/ down 8\" step at 2/10 pain (Can accomplish with 3/10 pain reported. 19APR)  Long term goal 3: Sit & drive 1 hr (rpts driving max of 20'. No pain. 19APR)  Long term goal 4: Continue his active wellness regimine  (Met. 19APR)          Plan:   [] Continue per plan of care [] Alter current plan (see comments)  [] Plan of care initiated [x] Hold pending MD visit [] Discharge    Plan for Next Session: Pt placed on hold.      Electronically signed by:  Geneva Portillo

## 2019-04-29 ENCOUNTER — TELEPHONE (OUTPATIENT)
Dept: FAMILY MEDICINE CLINIC | Age: 74
End: 2019-04-29

## 2019-04-29 ENCOUNTER — HOSPITAL ENCOUNTER (OUTPATIENT)
Dept: PHYSICAL THERAPY | Age: 74
Setting detail: THERAPIES SERIES
Discharge: HOME | End: 2019-04-29
Payer: MEDICARE

## 2019-04-29 DIAGNOSIS — M54.5 LOW BACK PAIN, UNSPECIFIED BACK PAIN LATERALITY, UNSPECIFIED CHRONICITY, WITH SCIATICA PRESENCE UNSPECIFIED: Primary | ICD-10-CM

## 2019-04-29 PROCEDURE — 97110 THERAPEUTIC EXERCISES: CPT

## 2019-04-29 NOTE — FLOWSHEET NOTE
Physical Therapy Daily Treatment Note    Date:  2019    Patient Name:  Asia Cai   \"Oneil\"  :  1945  MRN: [id number]  Restrictions/Precautions:     Medical/Treatment Diagnosis Information:   · Diagnosis: M25.561 R knee pain  · Treatment Diagnosis: M25.561 R knee pain  Insurance/Certification information:  PT Insurance Information: Medicare  Physician Information:  Referring Practitioner: Aleena  Plan of care signed (Y/N):  Yes  Visit# / total visits:  4/10  Pain level: 10     Functional Assessment Tool Used: LEFS  Score: 59/80 = 74%, or 26% disability (Scored 80/80=0% disability. 29APR)    Time In: 9:50   Time Out:  10:31    Progress Note: []  Yes  [x]  No  Next due by: Visit #10, or 19      Subjective: Pt rpts to clinic with moderate complaints of centralized LBP stating \"I feel it right in my back today. I have no symptoms anywhere else\". Objective: Pt tolerated todays session well indicating no knee pain throughout entire session. Able to perform all SYLVIA per flow chart to increase LE strength and stability with little vc required for proper performance. Most challenged by squats noting fatigue. Pt agreed to d/c this date and instructed to return to PCP regarding back and hip issues for possible PT interventions with understanding noted. Met all goals this date. Observation: No discrepancies noted.     Test measurements:      Exercises: there ex for flexibility, strength, and pat-fem joint mechanics  Exercise/Equipment Resistance/Repetitions Other comments   HS stretch 10x    ITB stretch 10x    PKF     Schneider tape 5'    TKE 20x blue    HS curl 20x blue    Squat Matrix 10x, 9-positions Progress to all positions   Step up 6\" x10ea Fwd/lat/retro   Resisted lateral walk grn 2 laps     Monster walk grn 2 laps    4 way hip grn, x15    4-way resisted walking with cybex 5 laps ea 6 plates   TM retro     US 8'    [x] Provided verbal/tactile cueing for activities related to strengthening,

## 2019-04-30 ENCOUNTER — OFFICE VISIT (OUTPATIENT)
Dept: PRIMARY CARE CLINIC | Age: 74
End: 2019-04-30
Payer: MEDICARE

## 2019-04-30 VITALS
DIASTOLIC BLOOD PRESSURE: 80 MMHG | RESPIRATION RATE: 16 BRPM | SYSTOLIC BLOOD PRESSURE: 124 MMHG | OXYGEN SATURATION: 99 % | HEART RATE: 58 BPM | WEIGHT: 192 LBS | TEMPERATURE: 97.9 F | BODY MASS INDEX: 27.49 KG/M2 | HEIGHT: 70 IN

## 2019-04-30 DIAGNOSIS — M54.50 ACUTE BILATERAL LOW BACK PAIN WITHOUT SCIATICA: Primary | ICD-10-CM

## 2019-04-30 PROCEDURE — 1036F TOBACCO NON-USER: CPT | Performed by: FAMILY MEDICINE

## 2019-04-30 PROCEDURE — G8417 CALC BMI ABV UP PARAM F/U: HCPCS | Performed by: FAMILY MEDICINE

## 2019-04-30 PROCEDURE — 4040F PNEUMOC VAC/ADMIN/RCVD: CPT | Performed by: FAMILY MEDICINE

## 2019-04-30 PROCEDURE — 1123F ACP DISCUSS/DSCN MKR DOCD: CPT | Performed by: FAMILY MEDICINE

## 2019-04-30 PROCEDURE — 3017F COLORECTAL CA SCREEN DOC REV: CPT | Performed by: FAMILY MEDICINE

## 2019-04-30 PROCEDURE — 99213 OFFICE O/P EST LOW 20 MIN: CPT | Performed by: FAMILY MEDICINE

## 2019-04-30 PROCEDURE — G8427 DOCREV CUR MEDS BY ELIG CLIN: HCPCS | Performed by: FAMILY MEDICINE

## 2019-04-30 ASSESSMENT — PATIENT HEALTH QUESTIONNAIRE - PHQ9
2. FEELING DOWN, DEPRESSED OR HOPELESS: 0
SUM OF ALL RESPONSES TO PHQ QUESTIONS 1-9: 0
1. LITTLE INTEREST OR PLEASURE IN DOING THINGS: 0
SUM OF ALL RESPONSES TO PHQ9 QUESTIONS 1 & 2: 0
SUM OF ALL RESPONSES TO PHQ QUESTIONS 1-9: 0

## 2019-04-30 NOTE — PROGRESS NOTES
I have reviewed and agree to the content of the note written by the PTA.   Electronically signed by Demetris Parker PT [id number]

## 2019-04-30 NOTE — PROGRESS NOTES
[facility]             [address] [facility], [place], [address]                        Telephone [phone]             Fax [phone]     Enoc Brooke  1945  MRN:  [id number]   Date of visit:  4/30/2019     Subjective:    Enco Brooke is a 68 y.o.  male who presents to [facility] today (4/30/2019) for evaluation of:  Back Pain (low back pain, started several weeks ago. )      He states that he has had back pain for the past couple of weeks. He was helping a friend with some work, and the pain flared up. He denies radiation into his legs. He has been going to physical therapy for knee pain, which has helped a lot. He would like to do some physical therapy for his back. He states that he has had occasional back pain in the past.  He has never had back surgery. He denies difficulty with his bowels or bladder. He denies weakness or numbness. He has the following problem list:  Patient Active Problem List   Diagnosis    Essential hypertension    History of testicular cancer    Family history of thalassemia    Mixed hyperlipidemia    High risk medication use    Elevated fasting glucose        Current medications are:  Current Outpatient Medications   Medication Sig Dispense Refill    NONFORMULARY Tumeric daily      Vitamin D (CHOLECALCIFEROL) 1000 UNITS CAPS capsule Take 1,000 Units by mouth daily.  aspirin 81 MG tablet Take 81 mg by mouth daily.  Coenzyme Q10 (COQ10) 200 MG CAPS Take  by mouth daily.  COD LIVER OIL PO Take  by mouth daily.  MILK THISTLE PO Take by mouth Daily       No current facility-administered medications for this visit. He is allergic to lipitor [atorvastatin]. .    He  reports that he has quit smoking. His smoking use included cigarettes.  He has never used smokeless tobacco.      Objective:    Vitals:    04/30/19 0956   BP: 124/80

## 2019-05-03 ENCOUNTER — HOSPITAL ENCOUNTER (OUTPATIENT)
Dept: PHYSICAL THERAPY | Age: 74
Setting detail: THERAPIES SERIES
Discharge: HOME | End: 2019-05-03
Payer: MEDICARE

## 2019-05-03 PROCEDURE — 97161 PT EVAL LOW COMPLEX 20 MIN: CPT | Performed by: PHYSICAL THERAPIST

## 2019-05-03 PROCEDURE — 97110 THERAPEUTIC EXERCISES: CPT | Performed by: PHYSICAL THERAPIST

## 2019-05-03 ASSESSMENT — PAIN - FUNCTIONAL ASSESSMENT: PAIN_FUNCTIONAL_ASSESSMENT: PREVENTS OR INTERFERES SOME ACTIVE ACTIVITIES AND ADLS

## 2019-05-03 ASSESSMENT — PAIN DESCRIPTION - FREQUENCY: FREQUENCY: CONTINUOUS

## 2019-05-03 ASSESSMENT — PAIN DESCRIPTION - DESCRIPTORS: DESCRIPTORS: SHARP;TIGHTNESS

## 2019-05-03 ASSESSMENT — PAIN DESCRIPTION - ORIENTATION: ORIENTATION: LEFT;RIGHT

## 2019-05-03 ASSESSMENT — PAIN DESCRIPTION - PROGRESSION: CLINICAL_PROGRESSION: GRADUALLY IMPROVING

## 2019-05-03 ASSESSMENT — PAIN DESCRIPTION - PAIN TYPE: TYPE: ACUTE PAIN

## 2019-05-03 ASSESSMENT — PAIN DESCRIPTION - ONSET: ONSET: SUDDEN

## 2019-05-03 ASSESSMENT — PAIN DESCRIPTION - LOCATION: LOCATION: BACK

## 2019-05-03 ASSESSMENT — PAIN SCALES - GENERAL: PAINLEVEL_OUTOF10: 6

## 2019-05-03 NOTE — FLOWSHEET NOTE
Physical Therapy Daily Treatment Note    Date:  5/3/2019    Patient Name:  Cathy Cottrell    :  1945  MRN: [id number]  Restrictions/Precautions:     Medical/Treatment Diagnosis Information:   · Diagnosis: M54.5 acute LBP  · Treatment Diagnosis: M54.5 acute LBP  Insurance/Certification information:  PT Insurance Information: Medicare  Physician Information:  Referring Practitioner: [address] [place] [address],  [address] of care signed (Y/N):  n  Visit# / total visits:   Pain level: 8/10     Oswetry LBP Scale  - 38%       Time In:8:00   Time Out:8:36    Progress Note: [x]  Yes  []  No  Next due by: Visit #8, or 19      Subjective:   See eval    Objective: See eval  Observation:   Test measurements:      Exercises: There ex for lumbar ROM, correction of posture, reduction of post derangement  Exercise/Equipment Resistance/Repetitions Other comments   Prone on elbows 5'    Press up 10x x3    B PKF 10x x3    L Lateral shift correction 10x x2 In doorway, forearms on frame, hips to L    Back bend 10x x2         Manual shift correction 3'                                         [x] Provided verbal/tactile cueing for activities related to strengthening, flexibility, endurance, ROM. (97993)  [] Provided verbal/tactile cueing for activities related to improving balance, coordination, kinesthetic sense, posture, motor skill, proprioception. (47118)    Therapeutic Activities:     [] Therapeutic activities, direct (one-on-one) patient contact (use of dynamic activities to improve functional performance). (95169)    Gait:   [] Provided training and instruction to the patient for ambulation re-education. (30521)    Self-Care/ADL's  [] Self-care/home management training and compensatory training, meal preparation, safety procedures, and instructions in use of assistive technology devices/adaptive equipment, direct one-on-one contact.  (34217)    Home Exercise Program:  Lumbar extension progression with L lat shift correction   [x]

## 2019-05-03 NOTE — PLAN OF CARE
[facility]    [x] [place]  Phone: [phone]  Fax: [phone]      [] [place]  Phone: [phone]  Fax: [phone]        To: Referring Practitioner: Aleena      Patient: Catherine Chávez  : 1945   MRN: [id number]  Evaluation Date: 5/3/2019      Diagnosis Information:  · Diagnosis: M54.5 acute LBP   · Treatment Diagnosis: M54.5 acute LBP     Physical Therapy Certification Form  Dear Mark May  The following patient has been evaluated for physical therapy services and for therapy to continue, Medicare requires monthly physician review of the treatment plan. Please review the attached evaluation and/or summary of the patient's plan of care, and verify that you agree therapy should continue by signing the attached document and sending it back to our office. Plan of Care/Treatment to date:  [x] Therapeutic Exercise    [] Modalities:  [] Therapeutic Activity     [] Ultrasound  [x] Electrical Stimulation  [] Gait Training      [] Cervical Traction [] Lumbar Traction  [] Neuromuscular Re-education    [] Cold/hotpack [] Iontophoresis   [x] Instruction in HEP     Other:  [x] Manual Therapy      []             [] Aquatic Therapy      []           ? Goals:  Short term goals  Time Frame for Short term goals: 1 week  Short term goal 1: Start HEP    Long term goals  Time Frame for Long term goals : 4 weeks  Long term goal 1: Correct lateral shift posture to relieve abnormal spinal mechanics  Long term goal 2: Pain controlled 1/10 for return to regular activity  Long term goal 3: Able to bend, lift in proper fashion  Long term goal 4: Able to normal travel by car, plane, train    Frequency/Duration:5/3/19 - 19  # Days per week: [] 1 day # Weeks: [] 1 week [] 5 weeks     [x] 2 days?    [] 2 weeks [] 6 weeks     [] 3 days   [] 3 weeks [] 7 weeks     [] 4 days   [x] 4 weeks [] 8 weeks    Rehab Potential: [] Excellent [x] Good [] Fair  []

## 2019-05-03 NOTE — PROGRESS NOTES
Physical Therapy  Initial Assessment  Date: 5/3/2019  Patient Name: Laya Giles  MRN: [id number]  : 1945     Treatment Diagnosis: M54.5 acute LBP    Restrictions- none       Subjective   General  Chart Reviewed: Yes  Patient assessed for rehabilitation services?: Yes  Response To Previous Treatment: Not applicable  Family / Caregiver Present: No  Referring Practitioner: Aleena  Referral Date : 19  Diagnosis: M54.5 acute LBP  Follows Commands: Within Functional Limits  PT Visit Information  Onset Date: 19  PT Insurance Information: Medicare  Subjective  Subjective: Hurt the back about 2 weeks ago while carrying, moving stuff. Pain Screening  Patient Currently in Pain: Yes  Pain Assessment  Pain Assessment: 0-10  Pain Level: 6  Pain Type: Acute pain  Pain Location: Back  Pain Orientation: Left;Right  Pain Descriptors: Sharp;Tightness  Pain Frequency: Continuous  Pain Onset: Sudden  Clinical Progression: Gradually improving  Functional Pain Assessment: Prevents or interferes some active activities and ADLs  Vital Signs  Patient Currently in Pain: Yes    Orientation  Orientation  Overall Orientation Status: Within Normal Limits    Objective     Spine  Lumbar: Flexion major loss to the knees. Extension mod loss of 10% . L side glide no loss. R side glide major loss  Special Tests: FIS, RFIS increase, worse. EIS, NHAN decrease, better. YAKELIN, RFIL increase, worse. EIL, REIL decrease, better.   Joint Mobility  Spine: Lumbar post derangement, central, symmetrical, with a relevant L lat shift    Strength RLE  Strength RLE: WNL  Comment: no myotome weakness pattern  Strength LLE  Strength LLE: WNL  Comment: no  myotome weakness pattern     Additional Measures  Special Tests: L Slump + with LBP  Other: B SLR 65 deg, negative dural tension      Oswestry LBP Scale 19/50 = 38%     Assessment   Conditions Requiring Skilled Therapeutic Intervention  Body structures, Functions, Activity limitations: Decreased ROM  Treatment Diagnosis: M54.5 acute LBP  Prognosis: Good  Decision Making: Low Complexity  REQUIRES PT FOLLOW UP: Yes  Activity Tolerance  Activity Tolerance: Patient Tolerated treatment well         Plan   Plan  Times per week: 2  Current Treatment Recommendations: Strengthening, ROM, Manual Therapy - Joint Manipulation, Home Exercise Program, Modalities    Goals  Short term goals  Time Frame for Short term goals: 1 week  Short term goal 1: Start HEP  Long term goals  Time Frame for Long term goals : 4 weeks  Long term goal 1: Correct lateral shift posture to relieve abnormal spinal mechanics  Long term goal 2: Pain controlled 1/10 for return to regular activity  Long term goal 3: Able to bend, lift in proper fashion  Long term goal 4: Able to normal travel by car, plane, train       Therapy Time   Individual Concurrent Group Co-treatment   Time In 0800         Time Out 0836         Minutes 36                 [place], [place]

## 2019-05-06 ENCOUNTER — HOSPITAL ENCOUNTER (OUTPATIENT)
Dept: PHYSICAL THERAPY | Age: 74
Setting detail: THERAPIES SERIES
Discharge: HOME | End: 2019-05-06
Payer: MEDICARE

## 2019-05-06 PROCEDURE — 97110 THERAPEUTIC EXERCISES: CPT

## 2019-05-06 NOTE — FLOWSHEET NOTE
Physical Therapy Daily Treatment Note    Date:  2019    Patient Name:  Lina Mi    :  1945  MRN: [id number]  Restrictions/Precautions:     Medical/Treatment Diagnosis Information:   · Diagnosis: M54.5 acute LBP  · Treatment Diagnosis: M54.5 acute LBP  Insurance/Certification information:  PT Insurance Information: Medicare  Physician Information:  Referring Practitioner: [address] [place] [address],  [address] of care signed (Y/N):  n  Visit# / total visits:   Pain level: 310      Oswetry LBP Scale  - 38%       Time In: 9:02   Time Out: 9:41    Progress Note: []  Yes  [x]  No  Next due by: Visit #8, or 19      Subjective: Pt rpts to clinic with mild complaints of LBP without radicular symptoms stating \"I've been doing the exercises at home and I can tell its really been helping\". Objective: Pt tolerated todays first full PT session well indicating no increase in pain post session. Pt was able to perform all SYLVIA per flow chart to decrease LBP requiring vc to ensure proper performance of exercises. Most challenged by bridges this date noting fatigue. No rest breaks needed this date and no noted increases in pain with exercise completion. Demonstrates good understanding of HEP. Observation: Upright posture present. No antalgia. Test measurements:      Exercises: There ex for lumbar ROM, correction of posture, reduction of post derangement  Exercise/Equipment Resistance/Repetitions Other comments   Prone on elbows 5'    Press up 10x x3    B PKF 10x x3    L Lateral shift correction 10x x2 In doorway, forearms on frame, hips to L    Back bend 10x x2    Prone hip ext x10 ea    Manual shift correction 3'     Prone hip IR/ER x10  passive   LTR 10x5\"    Bridges 2x10    Standing hip ext/abd x15 ea    T-band alt ext/SPO x10 grn   Multifidus stabilization 3x1'    TM retro 5'    Mod ext at cntr x15    [x] Provided verbal/tactile cueing for activities related to strengthening, flexibility, endurance, ROM. (29899)  [] Provided verbal/tactile cueing for activities related to improving balance, coordination, kinesthetic sense, posture, motor skill, proprioception. (95302)    Therapeutic Activities:     [] Therapeutic activities, direct (one-on-one) patient contact (use of dynamic activities to improve functional performance). (47095)    Gait:   [] Provided training and instruction to the patient for ambulation re-education. (77615)    Self-Care/ADL's  [] Self-care/home management training and compensatory training, meal preparation, safety procedures, and instructions in use of assistive technology devices/adaptive equipment, direct one-on-one contact. (34661)    Home Exercise Program:  Lumbar extension progression with L lat shift correction   [x] Reviewed/Progressed HEP activities related to strengthening, flexibility, endurance, ROM. (87526)  [] Reviewed/Progressed HEP activities related to improving balance, coordination, kinesthetic sense, posture, motor skill, proprioception.  (46834)    Manual Treatments:    [] Provided manual therapy to mobilize soft tissue/joints for the purpose of modulating pain, promoting relaxation,  increasing ROM, reducing/eliminating soft tissue swelling/inflammation/restriction, improving soft tissue extensibility. (18557)    Service Based Modalities:      Timed Code Treatment Minutes:     There ex 39'    Total Treatment Minutes:   44'    Treatment/Activity Tolerance:  [x] Patient tolerated treatment well [] Patient limited by fatique  [] Patient limited by pain  [] Patient limited by other medical complications  [] Other:     Prognosis: [x] Good [] Fair  [] Poor    Patient Requires Follow-up: [x] Yes  [] No      Goals:  Short term goals  Time Frame for Short term goals: 1 week  Short term goal 1: Start HEP    Long term goals  Time Frame for Long term goals : 4 weeks  Long term goal 1: Correct lateral shift posture to relieve abnormal spinal mechanics  Long term goal 2: Pain controlled 1/10 for return to regular activity  Long term goal 3: Able to bend, lift in proper fashion  Long term goal 4: Able to normal travel by car, plane, train          Plan:   [x] Continue per plan of care [] Alter current plan (see comments)  [] Plan of care initiated [] Hold pending MD visit [] Discharge    Plan for Next Session:  Progress to tolerance.      Electronically signed by:  Marleny Martinez PTA

## 2019-05-07 NOTE — PROGRESS NOTES
I have reviewed and agree to the content of the note written by the PTA.   Electronically signed by Annabelle Upton PT [id number]

## 2019-05-09 ENCOUNTER — HOSPITAL ENCOUNTER (OUTPATIENT)
Dept: PHYSICAL THERAPY | Age: 74
Setting detail: THERAPIES SERIES
Discharge: HOME | End: 2019-05-09
Payer: MEDICARE

## 2019-05-09 PROCEDURE — 97110 THERAPEUTIC EXERCISES: CPT

## 2019-05-09 NOTE — FLOWSHEET NOTE
Physical Therapy Daily Treatment Note    Date:  2019    Patient Name:  Urbano Torres    :  1945  MRN: [id number]  Restrictions/Precautions:     Medical/Treatment Diagnosis Information:   · Diagnosis: M54.5 acute LBP  · Treatment Diagnosis: M54.5 acute LBP  Insurance/Certification information:  PT Insurance Information: Medicare  Physician Information:  Referring Practitioner: [address] [place] [address],  [address] of care signed (Y/N):  n  Visit# / total visits: 3/8  Pain level: 4/10      Oswetry LBP Scale 59 - 38%       Time In: 9:00   Time Out: 9:41    Progress Note: []  Yes  [x]  No  Next due by: Visit #8, or 19      Subjective: Pt rpts to clinic with moderate complaints of R sided LBP stating \"The last few days the pain was hurting mostly only in my R side. It goes down my R buttock sometimes\". Objective: Pt tolerated todays session well indicating decreased pain post session. Pt was able to complete all SYLVIA per flow chart to decrease LBP and radicular symptoms with little vc required for proper performance. Most challenged by prone hip extension. Symptoms subsided with initiated R side glide. Minimal complaints of pain after that exercise. Observation: Upright posture present. No antalgia. Test measurements:      Exercises: There ex for lumbar ROM, correction of posture, reduction of post derangement.   Exercise/Equipment Resistance/Repetitions Other comments   R side glide prone 3'    Prone on elbows 5'    Press up 10x x3    B PKF 10x x3    L Lateral shift correction 10x x2 In doorway, forearms on frame, hips to L    Back bend 10x x2    Prone hip ext x10 ea    Manual shift correction 3'     Prone hip IR/ER x10  passive   LTR 10x5\"    Bridges 2x10    Standing hip ext/abd x15 ea    T-band alt ext/SPO X10, x15 grn   Multifidus stabilization w/wand 3x1'    TM retro 5'    Mod ext at cntr x15    [x] Provided verbal/tactile cueing for activities related to strengthening, flexibility, endurance, ROM. (08657)  [] Provided verbal/tactile cueing for activities related to improving balance, coordination, kinesthetic sense, posture, motor skill, proprioception. (42760)    Therapeutic Activities:     [] Therapeutic activities, direct (one-on-one) patient contact (use of dynamic activities to improve functional performance). (79406)    Gait:   [] Provided training and instruction to the patient for ambulation re-education. (27018)    Self-Care/ADL's  [] Self-care/home management training and compensatory training, meal preparation, safety procedures, and instructions in use of assistive technology devices/adaptive equipment, direct one-on-one contact. (55722)    Home Exercise Program:  Lumbar extension progression with L lat shift correction   [x] Reviewed/Progressed HEP activities related to strengthening, flexibility, endurance, ROM. (06597)  [] Reviewed/Progressed HEP activities related to improving balance, coordination, kinesthetic sense, posture, motor skill, proprioception.  (73679)    Manual Treatments:    [] Provided manual therapy to mobilize soft tissue/joints for the purpose of modulating pain, promoting relaxation,  increasing ROM, reducing/eliminating soft tissue swelling/inflammation/restriction, improving soft tissue extensibility. (51809)    Service Based Modalities:      Timed Code Treatment Minutes:     There ex 41'    Total Treatment Minutes:   39'    Treatment/Activity Tolerance:  [x] Patient tolerated treatment well [] Patient limited by fatique  [] Patient limited by pain  [] Patient limited by other medical complications  [] Other:     Prognosis: [x] Good [] Fair  [] Poor    Patient Requires Follow-up: [x] Yes  [] No      Goals:  Short term goals  Time Frame for Short term goals: 1 week  Short term goal 1: Start HEP    Long term goals  Time Frame for Long term goals : 4 weeks  Long term goal 1: Correct lateral shift posture to relieve abnormal spinal mechanics  Long term goal 2: Pain controlled 1/10 for return to regular activity  Long term goal 3: Able to bend, lift in proper fashion  Long term goal 4: Able to normal travel by car, plane, train          Plan:   [x] Continue per plan of care [] Alter current plan (see comments)  [] Plan of care initiated [] Hold pending MD visit [] Discharge    Plan for Next Session:  Progress to tolerance.      Electronically signed by:  Peter Cardenas PTA

## 2019-05-10 ENCOUNTER — TELEPHONE (OUTPATIENT)
Dept: FAMILY MEDICINE CLINIC | Age: 74
End: 2019-05-10

## 2019-05-10 DIAGNOSIS — Z85.47 HISTORY OF TESTICULAR CANCER: Primary | ICD-10-CM

## 2019-05-10 NOTE — TELEPHONE ENCOUNTER
Tiffanie Garvey [facility] called - the HCG ordered for Monday needs to be a quant for tumor marker, not the qualitative. Order corrected.

## 2019-05-13 ENCOUNTER — HOSPITAL ENCOUNTER (OUTPATIENT)
Dept: PHYSICAL THERAPY | Age: 74
Setting detail: THERAPIES SERIES
Discharge: HOME | End: 2019-05-13
Payer: MEDICARE

## 2019-05-13 PROCEDURE — 97110 THERAPEUTIC EXERCISES: CPT

## 2019-05-13 NOTE — PROGRESS NOTES
I have reviewed and agree to the content of the note written by the PTA.   Electronically signed by Annabelle Upton PT [id number]

## 2019-05-13 NOTE — FLOWSHEET NOTE
Physical Therapy Daily Treatment Note    Date:  2019    Patient Name:  Ny Monson    :  1945  MRN: [id number]  Restrictions/Precautions:     Medical/Treatment Diagnosis Information:   · Diagnosis: M54.5 acute LBP  · Treatment Diagnosis: M54.5 acute LBP  Insurance/Certification information:  PT Insurance Information: Medicare  Physician Information:  Referring Practitioner: [address] [place] [address],  [address] of care signed (Y/N):  n  Visit# / total visits:   Pain level: 2/10      Oswetry LBP Scale 59 - 38%       Time In: 11:16   Time Out:  11:56    Progress Note: []  Yes  [x]  No  Next due by: Visit #8, or 19      Subjective: Pt rpts to clinic with mild complaints of LBP with mild radicular symptoms stating \"I had some increased pain this morning that went away when I did a sitting lumbar flex forward. I felt it into my buttock a bit\". Objective: Pt tolerated todays session well indicating decreased complaints of pain post session. Performed all SYLVIA per flow chart to decrease LBP and radiuclar symptoms with little vc required for proper performance. Most challenged by progressed press ups with OP applied. Progressed prone hip IR/ER with good tolerance noted. Good bed mobility present. Observation: Upright posture present. No antalgia. Test measurements:      Exercises: There ex for lumbar ROM, correction of posture, reduction of post derangement.   Exercise/Equipment Resistance/Repetitions Other comments   R side glide prone 3'    Prone on elbows 5'    Press up 10x x3 W/ OP   B PKF 10x x3    L Lateral shift correction 10x x2 In doorway, forearms on frame, hips to L    Back bend 10x x2    Prone hip ext x15 ea    Manual shift correction 3'     Prone hip IR/ER x10  red   LTR 10x5\"    Bridges 2x10    Standing hip ext/abd x15 ea    T-band alt ext/SPO X10, x15 jann   Multifidus stabilization w/wand 3x1'    TM retro 5'    Mod ext at cntr x15    [x] Provided verbal/tactile cueing for activities related to strengthening, flexibility, endurance, ROM. (27448)  [] Provided verbal/tactile cueing for activities related to improving balance, coordination, kinesthetic sense, posture, motor skill, proprioception. (68427)    Therapeutic Activities:     [] Therapeutic activities, direct (one-on-one) patient contact (use of dynamic activities to improve functional performance). (56786)    Gait:   [] Provided training and instruction to the patient for ambulation re-education. (37441)    Self-Care/ADL's  [] Self-care/home management training and compensatory training, meal preparation, safety procedures, and instructions in use of assistive technology devices/adaptive equipment, direct one-on-one contact. (43662)    Home Exercise Program:  Lumbar extension progression with L lat shift correction   [x] Reviewed/Progressed HEP activities related to strengthening, flexibility, endurance, ROM. (19247)  [] Reviewed/Progressed HEP activities related to improving balance, coordination, kinesthetic sense, posture, motor skill, proprioception.  (54802)    Manual Treatments:    [] Provided manual therapy to mobilize soft tissue/joints for the purpose of modulating pain, promoting relaxation,  increasing ROM, reducing/eliminating soft tissue swelling/inflammation/restriction, improving soft tissue extensibility. (41948)    Service Based Modalities:      Timed Code Treatment Minutes:     There ex 40'    Total Treatment Minutes:   36'    Treatment/Activity Tolerance:  [x] Patient tolerated treatment well [] Patient limited by fatique  [] Patient limited by pain  [] Patient limited by other medical complications  [] Other:     Prognosis: [x] Good [] Fair  [] Poor    Patient Requires Follow-up: [x] Yes  [] No      Goals:  Short term goals  Time Frame for Short term goals: 1 week  Short term goal 1: Start HEP    Long term goals  Time Frame for Long term goals : 4 weeks  Long term goal 1: Correct lateral shift posture to relieve abnormal spinal mechanics  Long term goal 2: Pain controlled 1/10 for return to regular activity  Long term goal 3: Able to bend, lift in proper fashion  Long term goal 4: Able to normal travel by car, plane, train          Plan:   [x] Continue per plan of care [] Alter current plan (see comments)  [] Plan of care initiated [] Hold pending MD visit [] Discharge    Plan for Next Session:  Progress to tolerance.      Electronically signed by:  Dana Trejo PTA

## 2019-05-14 ENCOUNTER — HOSPITAL ENCOUNTER (OUTPATIENT)
Dept: GENERAL RADIOLOGY | Age: 74
Discharge: HOME | End: 2019-05-16
Payer: MEDICARE

## 2019-05-14 ENCOUNTER — HOSPITAL ENCOUNTER (OUTPATIENT)
Dept: LAB | Age: 74
Discharge: HOME | End: 2019-05-14
Payer: MEDICARE

## 2019-05-14 DIAGNOSIS — E78.2 MIXED HYPERLIPIDEMIA: ICD-10-CM

## 2019-05-14 DIAGNOSIS — R73.01 ELEVATED FASTING GLUCOSE: ICD-10-CM

## 2019-05-14 DIAGNOSIS — I10 ESSENTIAL HYPERTENSION: ICD-10-CM

## 2019-05-14 DIAGNOSIS — Z85.47 HISTORY OF TESTICULAR CANCER: ICD-10-CM

## 2019-05-14 LAB
ABSOLUTE EOS #: 0.12 K/UL (ref 0–0.4)
ABSOLUTE IMMATURE GRANULOCYTE: ABNORMAL K/UL (ref 0–0.3)
ABSOLUTE LYMPH #: 1.3 K/UL (ref 1–4.8)
ABSOLUTE MONO #: 0.53 K/UL (ref 0.1–1.2)
AFP: 2.9 UG/L
ALBUMIN SERPL-MCNC: 4.5 G/DL (ref 3.5–5.2)
ALBUMIN/GLOBULIN RATIO: 1.4 (ref 1–2.5)
ALP BLD-CCNC: 74 U/L (ref 40–129)
ALT SERPL-CCNC: 12 U/L (ref 5–41)
ANION GAP SERPL CALCULATED.3IONS-SCNC: 10 MMOL/L (ref 9–17)
AST SERPL-CCNC: 17 U/L
BASOPHILS # BLD: 1 % (ref 0–2)
BASOPHILS ABSOLUTE: 0.06 K/UL (ref 0–0.2)
BILIRUB SERPL-MCNC: 0.38 MG/DL (ref 0.3–1.2)
BUN BLDV-MCNC: 16 MG/DL (ref 8–23)
BUN/CREAT BLD: 17 (ref 9–20)
CALCIUM SERPL-MCNC: 9.9 MG/DL (ref 8.6–10.4)
CHLORIDE BLD-SCNC: 100 MMOL/L (ref 98–107)
CHOLESTEROL, FASTING: 224 MG/DL
CHOLESTEROL/HDL RATIO: 4.5
CO2: 28 MMOL/L (ref 20–31)
CREAT SERPL-MCNC: 0.92 MG/DL (ref 0.7–1.2)
DIFFERENTIAL TYPE: ABNORMAL
EOSINOPHILS RELATIVE PERCENT: 2 % (ref 1–8)
GFR AFRICAN AMERICAN: >60 ML/MIN
GFR NON-AFRICAN AMERICAN: >60 ML/MIN
GFR SERPL CREATININE-BSD FRML MDRD: ABNORMAL ML/MIN/{1.73_M2}
GFR SERPL CREATININE-BSD FRML MDRD: ABNORMAL ML/MIN/{1.73_M2}
GLUCOSE BLD-MCNC: 108 MG/DL (ref 70–99)
HCG QUANTITATIVE: <1 IU/L
HCT VFR BLD CALC: 44.3 % (ref 41–53)
HDLC SERPL-MCNC: 50 MG/DL
HEMOGLOBIN: 13.7 G/DL (ref 13.5–17.5)
IMMATURE GRANULOCYTES: ABNORMAL %
LACTATE DEHYDROGENASE: 136 U/L (ref 135–225)
LDL CHOLESTEROL: 150 MG/DL (ref 0–130)
LYMPHOCYTES # BLD: 22 % (ref 15–43)
MCH RBC QN AUTO: 20.9 PG (ref 26–34)
MCHC RBC AUTO-ENTMCNC: 31 G/DL (ref 31–37)
MCV RBC AUTO: 67.3 FL (ref 80–100)
MONOCYTES # BLD: 9 % (ref 6–14)
MORPHOLOGY: ABNORMAL
NRBC AUTOMATED: ABNORMAL PER 100 WBC
PDW BLD-RTO: 18.7 % (ref 11–14.5)
PLATELET # BLD: 221 K/UL (ref 140–450)
PLATELET ESTIMATE: ABNORMAL
PMV BLD AUTO: 8.4 FL (ref 6–12)
POTASSIUM SERPL-SCNC: 4.3 MMOL/L (ref 3.7–5.3)
RBC # BLD: 6.58 M/UL (ref 4.5–5.9)
RBC # BLD: ABNORMAL 10*6/UL
SEG NEUTROPHILS: 66 % (ref 44–74)
SEGMENTED NEUTROPHILS ABSOLUTE COUNT: 3.89 K/UL (ref 1.8–7.7)
SODIUM BLD-SCNC: 138 MMOL/L (ref 135–144)
TOTAL PROTEIN: 7.7 G/DL (ref 6.4–8.3)
TRIGLYCERIDE, FASTING: 122 MG/DL
VLDLC SERPL CALC-MCNC: ABNORMAL MG/DL (ref 1–30)
WBC # BLD: 5.9 K/UL (ref 3.5–11)
WBC # BLD: ABNORMAL 10*3/UL

## 2019-05-14 PROCEDURE — 83615 LACTATE (LD) (LDH) ENZYME: CPT

## 2019-05-14 PROCEDURE — 85025 COMPLETE CBC W/AUTO DIFF WBC: CPT

## 2019-05-14 PROCEDURE — 80061 LIPID PANEL: CPT

## 2019-05-14 PROCEDURE — 80053 COMPREHEN METABOLIC PANEL: CPT

## 2019-05-14 PROCEDURE — 71046 X-RAY EXAM CHEST 2 VIEWS: CPT

## 2019-05-14 PROCEDURE — 36415 COLL VENOUS BLD VENIPUNCTURE: CPT

## 2019-05-14 PROCEDURE — 84702 CHORIONIC GONADOTROPIN TEST: CPT

## 2019-05-14 PROCEDURE — 82105 ALPHA-FETOPROTEIN SERUM: CPT

## 2019-05-14 NOTE — PROGRESS NOTES
I have reviewed and agree to the content of the note written by the PTA.   Electronically signed by Golda Apley PT [id number]

## 2019-05-15 ENCOUNTER — HOSPITAL ENCOUNTER (OUTPATIENT)
Dept: PHYSICAL THERAPY | Age: 74
Setting detail: THERAPIES SERIES
Discharge: HOME | End: 2019-05-15
Payer: MEDICARE

## 2019-05-15 PROCEDURE — 97110 THERAPEUTIC EXERCISES: CPT

## 2019-05-15 NOTE — FLOWSHEET NOTE
Physical Therapy Daily Treatment Note    Date:  5/15/2019    Patient Name:  Dylon Navarrete    :  1945  MRN: [id number]  Restrictions/Precautions:     Medical/Treatment Diagnosis Information:   · Diagnosis: M54.5 acute LBP   · Treatment Diagnosis: M54.5 acute LBP  Insurance/Certification information:  PT Insurance Information: Medicare  Physician Information:  Referring Practitioner: [email or web address]  Plan of care signed (Y/N):  n  Visit# / total visits:   Pain level: 1/10      Oswetry LBP Scale 19/59 - 38% (Scored 2/50=4% disability. 15MAY)       Time In: 11:18   Time Out:  12:00    Progress Note: []  Yes  [x]  No  Next due by: Visit #8, or 19      Subjective:  Pt rpts to clinic with mild complaints of centralized R sided LBP stating \"I am still having little issues if any at all. I'm not really limited with anything at home\". Agreed to test goals and be placed on hold this date. Objective: Pt tolerated todays session well indicating no increase in pain post session. Pt was able to complete all exercises per flow chart to decrease LBP with no vc required for proper performance. Pt met all available goals this date and scored 2/50=4% disability on Oswestry LBP scale. Agreed to be placed on hold prior to vacation and instructed to RTC only if symptoms worsen from vacation with good understanding noted. Pt requested general body stretching program and was provided with stretches abstaining from aggressive lumbar flexion positions. Observation: Upright posture present. No antalgia. Test measurements:      Exercises: There ex for lumbar ROM, correction of posture, reduction of post derangement.   Exercise/Equipment Resistance/Repetitions Other comments   R side glide prone 3'    Prone on elbows 5'    Press up 10x x3 W/ OP   B PKF 10x x3    L Lateral shift correction 10x x2 In doorway, forearms on frame, hips to L    Back bend 10x x2    Prone hip ext x15 ea    Manual shift correction 3'     Prone hip IR/ER x10 red   LTR 10x5\"    Bridges 2x10 With add squeeze   Standing hip ext/abd x15 ea    T-band alt ext/SPO X10, x15 jann   Multifidus stabilization w/wand 3x1'    TM retro 5'    Mod ext at cntr x20    [x] Provided verbal/tactile cueing for activities related to strengthening, flexibility, endurance, ROM. (35514)  [] Provided verbal/tactile cueing for activities related to improving balance, coordination, kinesthetic sense, posture, motor skill, proprioception. (83939)    Therapeutic Activities:     [] Therapeutic activities, direct (one-on-one) patient contact (use of dynamic activities to improve functional performance). (23675)    Gait:   [] Provided training and instruction to the patient for ambulation re-education. (58120)    Self-Care/ADL's  [] Self-care/home management training and compensatory training, meal preparation, safety procedures, and instructions in use of assistive technology devices/adaptive equipment, direct one-on-one contact. (72714)    Home Exercise Program:  Continue current HEP during vacation  [x] Reviewed/Progressed HEP activities related to strengthening, flexibility, endurance, ROM. (39612)  [] Reviewed/Progressed HEP activities related to improving balance, coordination, kinesthetic sense, posture, motor skill, proprioception.  (55459)    Manual Treatments:    [] Provided manual therapy to mobilize soft tissue/joints for the purpose of modulating pain, promoting relaxation,  increasing ROM, reducing/eliminating soft tissue swelling/inflammation/restriction, improving soft tissue extensibility. (96575)    Service Based Modalities:      Timed Code Treatment Minutes:     There ex 42'    Total Treatment Minutes:   43'    Treatment/Activity Tolerance:  [x] Patient tolerated treatment well [] Patient limited by fatique  [] Patient limited by pain  [] Patient limited by other medical complications  [] Other:     Prognosis: [x] Good [] Fair  [] Poor    Patient Requires Follow-up: [] Yes  [x] No      Goals:  Short term goals  Time Frame for Short term goals: 1 week  Short term goal 1: Start HEP    Long term goals  Time Frame for Long term goals : 4 weeks  Long term goal 1: Correct lateral shift posture to relieve abnormal spinal mechanics (Met. 15MAY)  Long term goal 2: Pain controlled 1/10 for return to regular activity (Met. 15MAY)  Long term goal 3: Able to bend, lift in proper fashion (Sts having no issues with bending/lifting activities. 15MAY)  Long term goal 4: Able to normal travel by car, plane, train (Sts having no issues with car travel. No attempt for plane or train. 15MAY)          Plan:   [] Continue per plan of care [] Alter current plan (see comments)  [] Plan of care initiated [x] Hold pending MD visit [] Discharge    Plan for Next Session:  Pt placed on hold this date.      Electronically signed by:  Ezra Cook PTA

## 2019-06-04 ENCOUNTER — OFFICE VISIT (OUTPATIENT)
Dept: FAMILY MEDICINE CLINIC | Age: 74
End: 2019-06-04
Payer: MEDICARE

## 2019-06-04 VITALS
BODY MASS INDEX: 27.32 KG/M2 | RESPIRATION RATE: 16 BRPM | HEIGHT: 70 IN | HEART RATE: 74 BPM | DIASTOLIC BLOOD PRESSURE: 82 MMHG | SYSTOLIC BLOOD PRESSURE: 128 MMHG | WEIGHT: 190.8 LBS

## 2019-06-04 DIAGNOSIS — E78.2 MIXED HYPERLIPIDEMIA: ICD-10-CM

## 2019-06-04 DIAGNOSIS — Z85.47 HISTORY OF TESTICULAR CANCER: ICD-10-CM

## 2019-06-04 DIAGNOSIS — Z00.00 ROUTINE GENERAL MEDICAL EXAMINATION AT A HEALTH CARE FACILITY: Primary | ICD-10-CM

## 2019-06-04 DIAGNOSIS — R73.01 ELEVATED FASTING GLUCOSE: ICD-10-CM

## 2019-06-04 DIAGNOSIS — I10 ESSENTIAL HYPERTENSION: ICD-10-CM

## 2019-06-04 PROCEDURE — G8417 CALC BMI ABV UP PARAM F/U: HCPCS | Performed by: FAMILY MEDICINE

## 2019-06-04 PROCEDURE — G0439 PPPS, SUBSEQ VISIT: HCPCS | Performed by: FAMILY MEDICINE

## 2019-06-04 PROCEDURE — 1123F ACP DISCUSS/DSCN MKR DOCD: CPT | Performed by: FAMILY MEDICINE

## 2019-06-04 PROCEDURE — 3017F COLORECTAL CA SCREEN DOC REV: CPT | Performed by: FAMILY MEDICINE

## 2019-06-04 PROCEDURE — 99213 OFFICE O/P EST LOW 20 MIN: CPT | Performed by: FAMILY MEDICINE

## 2019-06-04 PROCEDURE — G8427 DOCREV CUR MEDS BY ELIG CLIN: HCPCS | Performed by: FAMILY MEDICINE

## 2019-06-04 PROCEDURE — 1036F TOBACCO NON-USER: CPT | Performed by: FAMILY MEDICINE

## 2019-06-04 PROCEDURE — 4040F PNEUMOC VAC/ADMIN/RCVD: CPT | Performed by: FAMILY MEDICINE

## 2019-06-04 ASSESSMENT — LIFESTYLE VARIABLES
HOW OFTEN DO YOU HAVE SIX OR MORE DRINKS ON ONE OCCASION: 0
HOW MANY STANDARD DRINKS CONTAINING ALCOHOL DO YOU HAVE ON A TYPICAL DAY: 0
HOW OFTEN DO YOU HAVE A DRINK CONTAINING ALCOHOL: 2
AUDIT-C TOTAL SCORE: 2

## 2019-06-04 ASSESSMENT — ANXIETY QUESTIONNAIRES: GAD7 TOTAL SCORE: 0

## 2019-06-04 ASSESSMENT — PATIENT HEALTH QUESTIONNAIRE - PHQ9
SUM OF ALL RESPONSES TO PHQ QUESTIONS 1-9: 0
SUM OF ALL RESPONSES TO PHQ QUESTIONS 1-9: 0

## 2019-06-04 NOTE — PATIENT INSTRUCTIONS
Patient Education        Learning About How to Make a Home Safe  Making your home safe: Overview  You can help protect the person in your care by making the home safe. Here are some general tips for how to lower the chance of getting injured in the home. · Pad sharp corners on furniture and counter tops. · Keep objects that are used often within easy reach. · Install handrails around the toilet and in the shower. Use a tub mat to prevent slipping. · Use a shower chair or bath bench when the person bathes. · Provide good lighting inside and outside the home. Put night-lights in bedrooms, hallways, and bathrooms. Have light at the top and bottom of stairways. · Have a first aid kit. It is also important to be aware of safe temperatures in the home. When helping someone bathe, use the back of your hand to test the water to make sure it's not too hot. Lower the temperature setting in the hot water heater to 120°F or lower to avoid burns. And make sure other liquids (such as coffee, tea, or soup) are not too hot. How can you protect from fire and carbon monoxide? Home safety alarms are very important. A smoke alarm can detect small amounts of smoke. This can allow time to escape from a fire. And a carbon monoxide alarm can let people know about this deadly gas before it starts to make them sick. · Put smoke alarms:  ? On each level of the home, in the hallway outside sleeping areas, and inside each bedroom. ? In the center of a ceiling, or on a wall 6 to 12 inches from the ceiling. This is where smoke goes first. Avoid places near doors, windows, or air ducts. · Put a carbon monoxide alarm in the hallway outside of the bedrooms in each sleeping area of the house. The alarm should be placed high on the wall. Make sure that the alarm can't be covered up by furniture or drapes. · Test alarms regularly by pressing the test button. · Replace non-lithium batteries in alarms twice a year.   · Have a plan for getting out of the home if there is a fire. Practice by having a fire drill. · Keep a fire extinguisher in the kitchen. What can you do to prevent falls? You can help prevent falls by keeping rooms uncluttered, with clear walkways around furniture. Keep electrical cords off the floor, and remove throw rugs to prevent tripping. If there are steps in the home, make sure they all have handrails, and always use the handrails. Don't leave items on the steps, and be sure to fix any that are loose, broken, or uneven. How can you increase safety for people with dementia? If you are caring for someone who has dementia, you may need to make some extra changes to create a safe home. People with dementia have a loss of mental skills, such as memory, problem solving, and learning. So things that might not have been a danger to them before can cause safety problems now. Here are some things to consider:  · Don't move furniture around. The person may become confused. · Use locks on doors and cupboards. Lock up knives, scissors, medicines, cleaning supplies, and other dangerous items. · Use hidden switches or controls for the stove, thermostat, water heater, and other appliances. · If your loved one is still cooking, think about whether that is safe. It may be okay with some help, depending on your loved one's condition. But for people who have memory or thinking problems, it's best to avoid any activities that might not be safe. · If the person tends to wander or to try to leave the home, install motion-sensor lights on all doors and windows. · Have emergency numbers in a central area near a phone. Include 911 and numbers for the doctor and family members. · Get medical alert jewelry for the person so you can be contacted if he or she wanders away. If possible, provide a safe place for wandering, such as an enclosed yard or garden. Where can you learn more? Go to https://mike.[employer]ut. org and sign in day. It is possible to meet your calcium requirement with diet alone, but a vitamin D supplement is usually necessary to meet this goal.  · When exposed to the sun, use a sunscreen that protects against both UVA and UVB radiation with an SPF of 30 or greater. Reapply every 2 to 3 hours or after sweating, drying off with a towel, or swimming. · Always wear a seat belt when traveling in a car. Always wear a helmet when riding a bicycle or motorcycle. Patient Education        Prediabetes: Care Instructions  Your Care Instructions    Prediabetes is a warning sign that you are at risk for getting type 2 diabetes. It means that your blood sugar is higher than it should be. The food you eat turns into sugar, which your body uses for energy. Normally, an organ called the pancreas makes insulin, which allows the sugar in your blood to get into your body's cells. But when your body can't use insulin the right way, the sugar doesn't move into cells. It stays in your blood instead. This is called insulin resistance. The buildup of sugar in the blood causes prediabetes. The good news is that lifestyle changes may help you get your blood sugar back to normal and help you avoid or delay diabetes. Follow-up care is a key part of your treatment and safety. Be sure to make and go to all appointments, and call your doctor if you are having problems. It's also a good idea to know your test results and keep a list of the medicines you take. How can you care for yourself at home? · Watch your weight. A healthy weight helps your body use insulin properly. · Limit the amount of calories, sweets, and unhealthy fat you eat. Ask your doctor if you should see a dietitian. A registered dietitian can help you create meal plans that fit your lifestyle. · Get at least 30 minutes of exercise on most days of the week. Exercise helps control your blood sugar. It also helps you maintain a healthy weight. Walking is a good choice.  You also may want to do other activities, such as running, swimming, cycling, or playing tennis or team sports. · Do not smoke. Smoking can make prediabetes worse. If you need help quitting, talk to your doctor about stop-smoking programs and medicines. These can increase your chances of quitting for good. · If your doctor prescribed medicines, take them exactly as prescribed. Call your doctor if you think you are having a problem with your medicine. You will get more details on the specific medicines your doctor prescribes. When should you call for help? Watch closely for changes in your health, and be sure to contact your doctor if:    · You have any symptoms of diabetes. These may include:  ? Being thirsty more often. ? Urinating more. ? Being hungrier. ? Losing weight. ? Being very tired. ? Having blurry vision.     · You have a wound that will not heal.     · You have an infection that will not go away.     · You have problems with your blood pressure.     · You want more information about diabetes and how you can keep from getting it. Where can you learn more? Go to https://[employer]. org and sign in to your [facility] account. Enter I222 in the 1spire box to learn more about \"Prediabetes: Care Instructions. \"     If you do not have an account, please click on the \"Sign Up Now\" link. Current as of: July 25, 2018  Content Version: 12.0  © 6991-4526 Healthwise, Incorporated. Care instructions adapted under license by [facility] ([facility]). If you have questions about a medical condition or this instruction, always ask your healthcare professional. [address] any warranty or liability for your use of this information.

## 2019-06-04 NOTE — PROGRESS NOTES
[address] [place], [address]                        Telephone [phone]             Fax [phone]  1945  MRN:  [id number]  Date of visit:  6/4/2019    Subjective:    Arvind Aguirre is a 68 y.o.  male who presents to [facility] today (6/4/2019) for follow up/evaluation of:  Medicare AWV; Hyperlipidemia (yearly follow up); and Other (hx of testicular CA-yearly follow up)      He states that he has been feeling well. He is taking no prescription medications. He has been exercising regularly. He denies chest pain or shortness of breath with activity or at rest.  He reports that he gets up a couple of times at night to urinate. He has the following problem list:  Patient Active Problem List   Diagnosis    Essential hypertension    History of testicular cancer    Family history of thalassemia    Mixed hyperlipidemia    High risk medication use    Elevated fasting glucose        Current medications are:  Outpatient Medications Marked as Taking for the 6/4/19 encounter (Office Visit) with Kerry Delacruz MD   Medication Sig Dispense Refill    NONFORMULARY Tumeric daily      Vitamin D (CHOLECALCIFEROL) 1000 UNITS CAPS capsule Take 1,000 Units by mouth daily.  aspirin 81 MG tablet Take 81 mg by mouth daily.  Coenzyme Q10 (COQ10) 200 MG CAPS Take  by mouth daily.  COD LIVER OIL PO Take  by mouth daily. He is allergic to lipitor [atorvastatin]. He is not currently a smoker. He  reports that he has quit smoking. His smoking use included cigarettes.  He has never used smokeless tobacco.      Objective:    Vitals:    06/04/19 0903   BP: 128/82   Site: Right Upper Arm   Position: Sitting   Cuff Size: Large Adult   Pulse: 74   Resp: 16   Weight: 190 lb 12.8 oz (86.5 kg)   Height: 5' 10\" (1.778 m)     Body mass index is Absolute Lymph # 05/14/2019 1.30  1.0 - 4.8 k/uL Final    Segs Absolute 05/14/2019 3.89  1.8 - 7.7 k/uL Final    Absolute Eos # 05/14/2019 0.12  0.0 - 0.4 k/uL Final    Basophils # 05/14/2019 0.06  0.0 - 0.2 k/uL Final    Morphology 05/14/2019 ANISOCYTOSIS PRESENT   Final    Morphology 05/14/2019 HYPOCHROMIA PRESENT   Final    Morphology 05/14/2019 MICROCYTOSIS PRESENT   Final    Morphology 05/14/2019 1+ OVALOCYTES   Final    Morphology 05/14/2019 Platelet count adequate   Final    Cholesterol, Fasting 05/14/2019 224* <200 mg/dL Final    Comment:    Cholesterol Guidelines:      <200  Desirable   200-240  Borderline      >240  Undesirable         HDL 05/14/2019 50  >40 mg/dL Final    Comment:    HDL Guidelines:    <40     Undesirable   40-59    Borderline    >59     Desirable         LDL Cholesterol 05/14/2019 150* 0 - 130 mg/dL Final    Comment:    LDL Guidelines:     <100    Desirable   100-129   Near to/above Desirable   130-159   Borderline      >159   Undesirable     Direct (measured) LDL and calculated LDL are not interchangeable tests.  Chol/HDL Ratio 05/14/2019 4.5  <5 Final            Triglyceride, Fasting 05/14/2019 122  <150 mg/dL Final    Comment:    Triglyceride Guidelines:     <150   Desirable   150-199  Borderline   200-499  High     >499   Very high   Based on AHA Guidelines for fasting triglyceride, October 2012.  VLDL 05/14/2019 NOT REPORTED  1 - 30 mg/dL Final         Assessment and Plan:    1. Routine general medical examination at a health care facility  See separate progress note for Medicare Wellness Visit. 2. Essential hypertension  His blood pressure is adequately-controlled. (BP: 128/82)   He does not take any medications for hypertension currently. - Comprehensive Metabolic Panel; Future prior to his return visit in one year. 3. Mixed hyperlipidemia  His lipid profile was improved on his recent lab work.    He does not take any cholesterol-lowering medications at this time. - Lipid, Fasting; Future prior to his return visit in one year. 4. Elevated fasting glucose  His fasting glucose is elevated, but his HgbA1c is within normal limits. I recommended that he continue to exercise regularly and limit his intake of processed carbohydrates. - Hemoglobin A1C; Future prior to his return visit in one year. Printed information regarding Prediabetes was provided to the patient with his after visit summary. 5. History of testicular cancer  His tumor markers were within normal limits on his recent labs. 6.  Routine health maintenance  Health maintenance was reviewed with the patient. Colonoscopy was recommended. He plans to schedule this later this summer. All other health maintenance, including Shingrix, Pneumovax, Prevnar-13, and Tdap, is up to date at this time.           (Please note that portions of this note were completed with a voice-recognition program. Efforts were made to edit the dictation but occasionally words are mis-transcribed.)

## 2019-06-04 NOTE — PROGRESS NOTES
 Colon cancer screen colonoscopy  04/08/2019    A1C test (Diabetic or Prediabetic)  05/25/2019    Flu vaccine (Season Ended) 09/01/2019    Potassium monitoring  05/14/2020    Creatinine monitoring  05/14/2020    Lipid screen  05/14/2024    DTaP/Tdap/Td vaccine (2 - Td) 06/20/2024    Shingles Vaccine  Completed    Pneumococcal 65+ years Vaccine  Completed    AAA screen  Completed    Hepatitis C screen  Addressed     Recommendations for Preventive Services Due: see orders and patient instructions/AVS.  .   Recommended screening schedule for the next 5-10 years is provided to the patient in written form: see Patient Instructions/AVS.

## 2019-07-17 NOTE — DISCHARGE SUMMARY
[facility]    [x] [place]  Phone: [phone]  Fax: [phone]      [] [place]  Phone: [phone]  Fax: [phone]    Physical Therapy Discharge Note  Date: 2019        Patient Name:  Lina Mi    :  1945  MRN: [id number]  Restrictions/Precautions:      Medical/Treatment Diagnosis Information:  ·   Diagnosis: M54.5 acute LBP   · Treatment Diagnosis: M54.5 acute LBP  ·    Insurance/Certification information:     Medicare  Physician Information:     Aleena  Plan of care signed (Y/N): y  Visit# / total visits:  5  Pain level: 1/10       Time Period for Report:    Cancels/No-shows to date:      Plan of Care/Treatment to date:  [x] Therapeutic Exercise    [] Modalities:  [] Therapeutic Activity     [] Ultrasound  [x] Electrical Stimulation  [] Gait Training      [] Cervical Traction    [] Lumbar Traction  [] Neuromuscular Re-education  [] Cold/hotpack [] Iontophoresis  [x] Instruction in HEP      Other:  [x] Manual Therapy       []    [] Aquatic Therapy       []                    ? Subjective:     Pt rpts to clinic with mild complaints of centralized R sided LBP stating \"I am still having little issues if any at all. I'm not really limited with anything at home\". Objective: Shows reduction of post derangement.   Pain resolved        Plan:    d/c       Goals:    Short term goals  Time Frame for Short term goals: 1 week  Short term goal 1: Start HEP-met     Long term goals  Time Frame for Long term goals : 4 weeks  Long term goal 1: Correct lateral shift posture to relieve abnormal spinal mechanics (Met. 15MAY)  Long term goal 2: Pain controlled 1/10 for return to regular activity (Met. 15MAY)  Long term goal 3: Able to bend, lift in proper fashion-met  Long term goal 4: Able to normal travel by car, plane, train-met       Percentage of Goals Met: 100            Discharge Prognosis: [] Excellent [x] Good [] Fair  [] Poor     Goal Status:  [x] Achieved [] Partially Achieved  [] Not Achieved       Electronically signed by:  Bridgett Laboy, PT

## 2019-08-04 ENCOUNTER — PATIENT MESSAGE (OUTPATIENT)
Dept: FAMILY MEDICINE CLINIC | Age: 74
End: 2019-08-04

## 2019-10-18 ENCOUNTER — IMMUNIZATION (OUTPATIENT)
Dept: LAB | Age: 74
End: 2019-10-18
Payer: MEDICARE

## 2019-10-18 PROCEDURE — 90653 IIV ADJUVANT VACCINE IM: CPT | Performed by: FAMILY MEDICINE

## 2019-10-18 PROCEDURE — G0008 ADMIN INFLUENZA VIRUS VAC: HCPCS | Performed by: FAMILY MEDICINE

## 2020-05-28 ENCOUNTER — HOSPITAL ENCOUNTER (OUTPATIENT)
Dept: LAB | Age: 75
Discharge: HOME | End: 2020-05-28
Payer: MEDICARE

## 2020-05-28 LAB
ALBUMIN SERPL-MCNC: 4.5 G/DL (ref 3.5–5.2)
ALBUMIN/GLOBULIN RATIO: 1.5 (ref 1–2.5)
ALP BLD-CCNC: 76 U/L (ref 40–129)
ALT SERPL-CCNC: 15 U/L (ref 5–41)
ANION GAP SERPL CALCULATED.3IONS-SCNC: 11 MMOL/L (ref 9–17)
AST SERPL-CCNC: 22 U/L
BILIRUB SERPL-MCNC: 0.48 MG/DL (ref 0.3–1.2)
BUN BLDV-MCNC: 16 MG/DL (ref 8–23)
BUN/CREAT BLD: 16 (ref 9–20)
CALCIUM SERPL-MCNC: 9.6 MG/DL (ref 8.6–10.4)
CHLORIDE BLD-SCNC: 101 MMOL/L (ref 98–107)
CHOLESTEROL, FASTING: 234 MG/DL
CHOLESTEROL/HDL RATIO: 4
CO2: 26 MMOL/L (ref 20–31)
CREAT SERPL-MCNC: 0.97 MG/DL (ref 0.7–1.2)
ESTIMATED AVERAGE GLUCOSE: 123 MG/DL
GFR AFRICAN AMERICAN: >60 ML/MIN
GFR NON-AFRICAN AMERICAN: >60 ML/MIN
GFR SERPL CREATININE-BSD FRML MDRD: NORMAL ML/MIN/{1.73_M2}
GFR SERPL CREATININE-BSD FRML MDRD: NORMAL ML/MIN/{1.73_M2}
GLUCOSE BLD-MCNC: 92 MG/DL (ref 70–99)
HBA1C MFR BLD: 5.9 % (ref 4.8–5.9)
HDLC SERPL-MCNC: 58 MG/DL
LDL CHOLESTEROL: 155 MG/DL (ref 0–130)
POTASSIUM SERPL-SCNC: 4 MMOL/L (ref 3.7–5.3)
SODIUM BLD-SCNC: 138 MMOL/L (ref 135–144)
TOTAL PROTEIN: 7.6 G/DL (ref 6.4–8.3)
TRIGLYCERIDE, FASTING: 107 MG/DL
VLDLC SERPL CALC-MCNC: ABNORMAL MG/DL (ref 1–30)

## 2020-05-28 PROCEDURE — 80061 LIPID PANEL: CPT

## 2020-05-28 PROCEDURE — 36415 COLL VENOUS BLD VENIPUNCTURE: CPT

## 2020-05-28 PROCEDURE — 83036 HEMOGLOBIN GLYCOSYLATED A1C: CPT

## 2020-05-28 PROCEDURE — 80053 COMPREHEN METABOLIC PANEL: CPT

## 2020-06-11 ENCOUNTER — OFFICE VISIT (OUTPATIENT)
Dept: FAMILY MEDICINE CLINIC | Age: 75
End: 2020-06-11
Payer: MEDICARE

## 2020-06-11 VITALS
BODY MASS INDEX: 28.16 KG/M2 | HEIGHT: 70 IN | SYSTOLIC BLOOD PRESSURE: 132 MMHG | HEART RATE: 68 BPM | RESPIRATION RATE: 16 BRPM | WEIGHT: 196.7 LBS | DIASTOLIC BLOOD PRESSURE: 70 MMHG

## 2020-06-11 PROCEDURE — 99213 OFFICE O/P EST LOW 20 MIN: CPT | Performed by: FAMILY MEDICINE

## 2020-06-11 PROCEDURE — 4040F PNEUMOC VAC/ADMIN/RCVD: CPT | Performed by: FAMILY MEDICINE

## 2020-06-11 PROCEDURE — G0439 PPPS, SUBSEQ VISIT: HCPCS | Performed by: FAMILY MEDICINE

## 2020-06-11 PROCEDURE — 3017F COLORECTAL CA SCREEN DOC REV: CPT | Performed by: FAMILY MEDICINE

## 2020-06-11 PROCEDURE — 1123F ACP DISCUSS/DSCN MKR DOCD: CPT | Performed by: FAMILY MEDICINE

## 2020-06-11 PROCEDURE — 99212 OFFICE O/P EST SF 10 MIN: CPT

## 2020-06-11 PROCEDURE — 1036F TOBACCO NON-USER: CPT | Performed by: FAMILY MEDICINE

## 2020-06-11 PROCEDURE — G8417 CALC BMI ABV UP PARAM F/U: HCPCS | Performed by: FAMILY MEDICINE

## 2020-06-11 PROCEDURE — G8427 DOCREV CUR MEDS BY ELIG CLIN: HCPCS | Performed by: FAMILY MEDICINE

## 2020-06-11 RX ORDER — EZETIMIBE 10 MG/1
10 TABLET ORAL DAILY
Qty: 30 TABLET | Refills: 3 | Status: SHIPPED | OUTPATIENT
Start: 2020-06-11 | End: 2020-11-30

## 2020-06-11 ASSESSMENT — LIFESTYLE VARIABLES
HOW OFTEN DO YOU HAVE A DRINK CONTAINING ALCOHOL: 2
AUDIT-C TOTAL SCORE: 2
HOW MANY STANDARD DRINKS CONTAINING ALCOHOL DO YOU HAVE ON A TYPICAL DAY: 0
HOW OFTEN DO YOU HAVE SIX OR MORE DRINKS ON ONE OCCASION: 0

## 2020-06-11 ASSESSMENT — PATIENT HEALTH QUESTIONNAIRE - PHQ9
1. LITTLE INTEREST OR PLEASURE IN DOING THINGS: 0
SUM OF ALL RESPONSES TO PHQ9 QUESTIONS 1 & 2: 0
2. FEELING DOWN, DEPRESSED OR HOPELESS: 0
SUM OF ALL RESPONSES TO PHQ QUESTIONS 1-9: 0
SUM OF ALL RESPONSES TO PHQ QUESTIONS 1-9: 0

## 2020-06-11 NOTE — PROGRESS NOTES
kg)   06/04/19 190 lb 12.8 oz (86.5 kg)   04/30/19 192 lb (87.1 kg)     Vitals:    06/11/20 1642   BP: 132/70   Site: Right Upper Arm   Position: Sitting   Cuff Size: Medium Adult   Pulse: 68   Resp: 16   Weight: 196 lb 11.2 oz (89.2 kg)   Height: 5' 10\" (1.778 m)     Body mass index is 28.22 kg/m². Based upon direct observation of the patient, evaluation of cognition reveals recent and remote memory intact. Patient's complete Health Risk Assessment and screening values have been reviewed and are found in Flowsheets. The following problems were reviewed today and where indicated follow up appointments were made and/or referrals ordered.     Positive Risk Factor Screenings with Interventions:     Hearing/Vision:  No exam data present  Hearing/Vision  Do you or your family notice any trouble with your hearing?: No  Do you have difficulty driving, watching TV, or doing any of your daily activities because of your eyesight?: (!) Yes  Have you had an eye exam within the past year?: Yes  Hearing/Vision Interventions:  · Vision concerns:  patient declines any further evaluation/treatment for this issue    Safety:  Safety  Do you have working smoke detectors?: Yes  Have all throw rugs been removed or fastened?: Yes  Do you have non-slip mats or surfaces in all bathtubs/showers?: (!) No  Do all of your stairways have a railing or banister?: Yes  Are your doorways, halls and stairs free of clutter?: Yes  Do you always fasten your seatbelt when you are in a car?: Yes  Safety Interventions:  · Home safety tips provided    Personalized Preventive Plan   Current Health Maintenance Status  Immunization History   Administered Date(s) Administered    Hepatitis B 01/05/2009, 02/04/2009, 07/06/2009    Influenza, Triv, inactivated, subunit, adjuvanted, IM (Fluad 65 yrs and older) 10/18/2019    Pneumococcal Conjugate 13-valent ([id number]) 05/06/2015    Pneumococcal Polysaccharide ([id number]) 12/28/2009, 05/24/2017   

## 2020-06-11 NOTE — PATIENT INSTRUCTIONS
Personalized Preventive Plan for Sanya Burrows - 6/11/2020  Medicare offers a range of preventive health benefits. Some of the tests and screenings are paid in full while other may be subject to a deductible, co-insurance, and/or copay. Some of these benefits include a comprehensive review of your medical history including lifestyle, illnesses that may run in your family, and various assessments and screenings as appropriate. After reviewing your medical record and screening and assessments performed today your provider may have ordered immunizations, labs, imaging, and/or referrals for you. A list of these orders (if applicable) as well as your Preventive Care list are included within your After Visit Summary for your review. Other Preventive Recommendations:    · A preventive eye exam performed by an eye specialist is recommended every 1-2 years to screen for glaucoma; cataracts, macular degeneration, and other eye disorders. · A preventive dental visit is recommended every 6 months. · Try to get at least 150 minutes of exercise per week or 10,000 steps per day on a pedometer . · Order or download the FREE \"Exercise & Physical Activity: Your Everyday Guide\" from The [employer]. Call 7-365.394.2584 or search The [employer] online. · You need 1333-6730 mg of calcium and 9296-2983 IU of vitamin D per day. It is possible to meet your calcium requirement with diet alone, but a vitamin D supplement is usually necessary to meet this goal.  · When exposed to the sun, use a sunscreen that protects against both UVA and UVB radiation with an SPF of 30 or greater. Reapply every 2 to 3 hours or after sweating, drying off with a towel, or swimming. · Always wear a seat belt when traveling in a car. Always wear a helmet when riding a bicycle or motorcycle.     Keeping Home a [place] [facility]       As we get older, changes in balance, gait, strength, vision, hearing, and cognition make even the most youthful senior more prone to accidents. Falls are one of the leading health risks for older people. This increased risk of falling is related to:   Aging process (eg, decreased muscle strength, slowed reflexes)   Higher incidence of chronic health problems (eg, arthritis, diabetes) that may limit mobility, agility or sensory awareness   Side effects of medicine (eg, dizziness, blurred vision)especially medicines like prescription pain medicines and drugs used to treat mental health conditions   Depending on the brittleness of your bones, the consequences of a fall can be serious and long lasting. Home Life   Research by the [employer] [facility]) shows that some home accidents among older adults can be prevented by making simple lifestyle changes and basic modifications and repairs to the home environment. Here are some lifestyle changes that experts recommend:   Have your hearing and vision checked regularly. Be sure to wear prescription glasses that are right for you. Speak to your doctor or pharmacist about the possible side effects of your medicines. A number of medicines can cause dizziness. If you have problems with sleep, talk to your doctor. Limit your intake of alcohol. If necessary, use a cane or walker to help maintain your balance. Wear supportive, rubber-soled shoes, even at home. If you live in a region that gets wintry weather, you may want to put special cleats on your shoes to prevent you from slipping on the snow and ice. Exercise regularly to help maintain muscle tone, agility, and balance. Always hold the banister when going up or down stairs. Also, use  bars when getting in or out of the bath or shower, or using the toilet. To avoid dizziness, get up slowly from a lying down position. Sit up first, dangling your legs for a minute or two before rising to a standing position.    Overall Home Safety Check   According to the Consumer Product Safety

## 2020-06-11 NOTE — PROGRESS NOTES
[address] [place], [address]                        Telephone [phone]             Fax [phone]  1945  MRN:  [id number]  Date of visit:  6/11/2020    Subjective:    Toedora Remy is a 69 y.o.  male who presents to [facility] today (6/11/2020) for follow up/evaluation of:  Medicare AWV; Hyperlipidemia; Hyperglycemia; Hypertension; Mass; and Eye Problem      He has been walking for exercise. He denies chest pain or shortness of breath with activity or at rest.  He is only taking Aspirin currently. Hd has a lump on his left inner wrist that he first noticed 1 year ago. He states that two nights ago the area \"puffed\" up, and became tender. He denies injury. He also reports and episode on 04/13/2020 where he had double vision for about 5 minutes. He had no other symptoms at that time. He has had no additional episodes. He states that he has been trying to be consistent about taking an Aspirin daily. He has also tried to be more consistent regarding fluid intake. He has the following problem list:  Patient Active Problem List   Diagnosis    Essential hypertension    History of testicular cancer    Family history of thalassemia    Mixed hyperlipidemia    High risk medication use    Elevated fasting glucose        Current medications are:  Outpatient Medications Marked as Taking for the 6/11/20 encounter (Office Visit) with Heydi Schroeder MD   Medication Sig Dispense Refill    aspirin 81 MG tablet Take 81 mg by mouth daily. He is allergic to lipitor [atorvastatin]. He is not currently a smoker. He  reports that he has quit smoking. His smoking use included cigarettes.  He has never used smokeless tobacco.      Objective:    Vitals:    06/11/20 1642   BP: 132/70   Site: Right Upper Arm   Position: Sitting   Cuff Size: Medium Adult   Pulse: 68   Resp: 16   Weight: 196 lb 11.2 oz (89.2 kg)   Height: 5' 10\" (1.778 m)     Body mass index is 28.22 kg/m². Overweight elderly  male, healthy-appearing, alert, cooperative and in no distress. Neck supple. No adenopathy. Thyroid symmetric, normal size. Chest:  Normal expansion. Clear to auscultation. No rales, rhonchi, or wheezing. Respirations are not labored. Heart sounds are normal.  Regular rate and rhythm without murmur, gallop or rub. Lower extremities have no edema. There is good range of motion of the left wrist.  There is a soft mass palpable on the radial side of the left wrist.  This mass is not tender.     Labs done 5/28/2020 were reviewed with the patient:   Hospital Outpatient Visit on 05/28/2020   Component Date Value Ref Range Status    Hemoglobin A1C 05/28/2020 5.9  4.8 - 5.9 % Final    Estimated Avg Glucose 05/28/2020 123  mg/dL Final    Glucose 05/28/2020 92  70 - 99 mg/dL Final    BUN 05/28/2020 16  8 - 23 mg/dL Final    CREATININE 05/28/2020 0.97  0.70 - 1.20 mg/dL Final    Bun/Cre Ratio 05/28/2020 16  9 - 20 Final    Calcium 05/28/2020 9.6  8.6 - 10.4 mg/dL Final    Sodium 05/28/2020 138  135 - 144 mmol/L Final    Potassium 05/28/2020 4.0  3.7 - 5.3 mmol/L Final    Chloride 05/28/2020 101  98 - 107 mmol/L Final    CO2 05/28/2020 26  20 - 31 mmol/L Final    Anion Gap 05/28/2020 11  9 - 17 mmol/L Final    Alkaline Phosphatase 05/28/2020 76  40 - 129 U/L Final    ALT 05/28/2020 15  5 - 41 U/L Final    AST 05/28/2020 22  <40 U/L Final    Total Bilirubin 05/28/2020 0.48  0.3 - 1.2 mg/dL Final    Total Protein 05/28/2020 7.6  6.4 - 8.3 g/dL Final    Alb 05/28/2020 4.5  3.5 - 5.2 g/dL Final    Albumin/Globulin Ratio 05/28/2020 1.5  1.0 - 2.5 Final    GFR Non- 05/28/2020 >60  >60 mL/min Final    GFR  05/28/2020 >60  >60 mL/min Final    GFR Comment 05/28/2020        Final    Comment: Average GFR for 79or more years old:   76 mL/min/1.73sq m  Chronic Kidney Disease:   <60 mL/min/1.73sq m  Kidney failure:   <15 mL/min/1.73sq m              eGFR calculated using average adult body mass. Additional eGFR calculator available at:        [email or web address]            GFR Staging 05/28/2020 NOT REPORTED   Final    Cholesterol, Fasting 05/28/2020 234* <200 mg/dL Final    Comment:    Cholesterol Guidelines:      <200  Desirable   200-240  Borderline      >240  Undesirable         HDL 05/28/2020 58  >40 mg/dL Final    Comment:    HDL Guidelines:    <40     Undesirable   40-59    Borderline    >59     Desirable         LDL Cholesterol 05/28/2020 155* 0 - 130 mg/dL Final    Comment:    LDL Guidelines:     <100    Desirable   100-129   Near to/above Desirable   130-159   Borderline      >159   Undesirable     Direct (measured) LDL and calculated LDL are not interchangeable tests.  Chol/HDL Ratio 05/28/2020 4.0  <5 Final            Triglyceride, Fasting 05/28/2020 107  <150 mg/dL Final    Comment:    Triglyceride Guidelines:     <150   Desirable   150-199  Borderline   200-499  High     >499   Very high   Based on AHA Guidelines for fasting triglyceride, October 2012.  VLDL 05/28/2020 NOT REPORTED  1 - 30 mg/dL Final         Assessment and Plan:    1. Routine general medical examination at a health care facility  See separate progress note for Medicare Wellness Visit. 2. Essential hypertension  His blood pressure is adequately-controlled. (BP: 132/70)   - Comprehensive Metabolic Panel; Future prior to his return visit in one year. 3. Mixed hyperlipidemia  His lipid profile was worse on his recent lab work. He has been intolerant of statins in the past.  After discussion, Zetia was prescribed:   - ezetimibe (ZETIA) 10 MG tablet; Take 1 tablet by mouth daily  Dispense: 30 tablet; Refill: 3    Labs were ordered to be done in 2 months:  - AST;  Future  - Lipid Panel;

## 2020-08-10 ENCOUNTER — HOSPITAL ENCOUNTER (OUTPATIENT)
Dept: LAB | Age: 75
Discharge: HOME | End: 2020-08-10
Payer: MEDICARE

## 2020-08-10 LAB
AST SERPL-CCNC: 18 U/L
CHOLESTEROL/HDL RATIO: 3.7
CHOLESTEROL: 199 MG/DL
HDLC SERPL-MCNC: 54 MG/DL
LDL CHOLESTEROL: 123 MG/DL (ref 0–130)
TRIGL SERPL-MCNC: 112 MG/DL
VLDLC SERPL CALC-MCNC: NORMAL MG/DL (ref 1–30)

## 2020-08-10 PROCEDURE — 36415 COLL VENOUS BLD VENIPUNCTURE: CPT

## 2020-08-10 PROCEDURE — 80061 LIPID PANEL: CPT

## 2020-08-10 PROCEDURE — 84450 TRANSFERASE (AST) (SGOT): CPT

## 2020-10-02 ENCOUNTER — IMMUNIZATION (OUTPATIENT)
Dept: LAB | Age: 75
End: 2020-10-02
Payer: MEDICARE

## 2020-10-02 PROCEDURE — PBSHW INFLUENZA, QUADV, ADJUVANTED, 65 YRS +, IM, PF, PREFILL SYR, 0.5ML (FLUAD): Performed by: FAMILY MEDICINE

## 2020-10-02 PROCEDURE — 90694 VACC AIIV4 NO PRSRV 0.5ML IM: CPT | Performed by: FAMILY MEDICINE

## 2021-02-11 ENCOUNTER — IMMUNIZATION (OUTPATIENT)
Dept: LAB | Age: 76
End: 2021-02-11
Payer: MEDICARE

## 2021-02-11 PROCEDURE — 91301 COVID-19, MODERNA VACCINE 100MCG/0.5ML DOSE: CPT

## 2021-03-11 ENCOUNTER — IMMUNIZATION (OUTPATIENT)
Dept: LAB | Age: 76
End: 2021-03-11
Payer: MEDICARE

## 2021-03-11 PROCEDURE — 91301 COVID-19, MODERNA VACCINE 100MCG/0.5ML DOSE: CPT

## 2021-06-09 ENCOUNTER — HOSPITAL ENCOUNTER (OUTPATIENT)
Dept: LAB | Age: 76
Discharge: HOME | End: 2021-06-09
Payer: MEDICARE

## 2021-06-09 DIAGNOSIS — R73.01 ELEVATED FASTING GLUCOSE: ICD-10-CM

## 2021-06-09 DIAGNOSIS — E78.2 MIXED HYPERLIPIDEMIA: ICD-10-CM

## 2021-06-09 DIAGNOSIS — I10 ESSENTIAL HYPERTENSION: ICD-10-CM

## 2021-06-09 LAB
ALBUMIN SERPL-MCNC: 4.3 G/DL (ref 3.5–5.2)
ALBUMIN/GLOBULIN RATIO: 1.4 (ref 1–2.5)
ALP BLD-CCNC: 77 U/L (ref 40–129)
ALT SERPL-CCNC: 13 U/L (ref 5–41)
ANION GAP SERPL CALCULATED.3IONS-SCNC: 8 MMOL/L (ref 9–17)
AST SERPL-CCNC: 19 U/L
BILIRUB SERPL-MCNC: 0.35 MG/DL (ref 0.3–1.2)
BUN BLDV-MCNC: 17 MG/DL (ref 8–23)
BUN/CREAT BLD: 15 (ref 9–20)
CALCIUM SERPL-MCNC: 9.6 MG/DL (ref 8.6–10.4)
CHLORIDE BLD-SCNC: 105 MMOL/L (ref 98–107)
CHOLESTEROL, FASTING: 206 MG/DL
CHOLESTEROL/HDL RATIO: 3.5
CO2: 26 MMOL/L (ref 20–31)
CREAT SERPL-MCNC: 1.11 MG/DL (ref 0.7–1.2)
ESTIMATED AVERAGE GLUCOSE: 128 MG/DL
GFR AFRICAN AMERICAN: >60 ML/MIN
GFR NON-AFRICAN AMERICAN: >60 ML/MIN
GFR SERPL CREATININE-BSD FRML MDRD: ABNORMAL ML/MIN/{1.73_M2}
GFR SERPL CREATININE-BSD FRML MDRD: ABNORMAL ML/MIN/{1.73_M2}
GLUCOSE BLD-MCNC: 117 MG/DL (ref 70–99)
HBA1C MFR BLD: 6.1 % (ref 4–6)
HDLC SERPL-MCNC: 59 MG/DL
LDL CHOLESTEROL: 127 MG/DL (ref 0–130)
POTASSIUM SERPL-SCNC: 4.7 MMOL/L (ref 3.7–5.3)
SODIUM BLD-SCNC: 139 MMOL/L (ref 135–144)
TOTAL PROTEIN: 7.3 G/DL (ref 6.4–8.3)
TRIGLYCERIDE, FASTING: 101 MG/DL
VLDLC SERPL CALC-MCNC: ABNORMAL MG/DL (ref 1–30)

## 2021-06-09 PROCEDURE — 80061 LIPID PANEL: CPT

## 2021-06-09 PROCEDURE — 83036 HEMOGLOBIN GLYCOSYLATED A1C: CPT

## 2021-06-09 PROCEDURE — 80053 COMPREHEN METABOLIC PANEL: CPT

## 2021-06-09 PROCEDURE — 36415 COLL VENOUS BLD VENIPUNCTURE: CPT

## 2021-06-16 ENCOUNTER — TELEPHONE (OUTPATIENT)
Dept: SURGERY | Age: 76
End: 2021-06-16

## 2021-06-16 ENCOUNTER — OFFICE VISIT (OUTPATIENT)
Dept: FAMILY MEDICINE CLINIC | Age: 76
End: 2021-06-16
Payer: MEDICARE

## 2021-06-16 VITALS
BODY MASS INDEX: 28.4 KG/M2 | HEIGHT: 70 IN | HEART RATE: 82 BPM | RESPIRATION RATE: 18 BRPM | WEIGHT: 198.4 LBS | SYSTOLIC BLOOD PRESSURE: 132 MMHG | DIASTOLIC BLOOD PRESSURE: 84 MMHG

## 2021-06-16 DIAGNOSIS — Z12.11 SCREENING FOR COLON CANCER: ICD-10-CM

## 2021-06-16 DIAGNOSIS — E78.2 MIXED HYPERLIPIDEMIA: ICD-10-CM

## 2021-06-16 DIAGNOSIS — Z00.00 ROUTINE GENERAL MEDICAL EXAMINATION AT A HEALTH CARE FACILITY: Primary | ICD-10-CM

## 2021-06-16 DIAGNOSIS — R73.01 ELEVATED FASTING GLUCOSE: ICD-10-CM

## 2021-06-16 PROCEDURE — 1036F TOBACCO NON-USER: CPT | Performed by: FAMILY MEDICINE

## 2021-06-16 PROCEDURE — 4040F PNEUMOC VAC/ADMIN/RCVD: CPT | Performed by: FAMILY MEDICINE

## 2021-06-16 PROCEDURE — G0439 PPPS, SUBSEQ VISIT: HCPCS | Performed by: FAMILY MEDICINE

## 2021-06-16 PROCEDURE — 99212 OFFICE O/P EST SF 10 MIN: CPT

## 2021-06-16 PROCEDURE — G8417 CALC BMI ABV UP PARAM F/U: HCPCS | Performed by: FAMILY MEDICINE

## 2021-06-16 PROCEDURE — 99213 OFFICE O/P EST LOW 20 MIN: CPT | Performed by: FAMILY MEDICINE

## 2021-06-16 PROCEDURE — G8427 DOCREV CUR MEDS BY ELIG CLIN: HCPCS | Performed by: FAMILY MEDICINE

## 2021-06-16 PROCEDURE — 3017F COLORECTAL CA SCREEN DOC REV: CPT | Performed by: FAMILY MEDICINE

## 2021-06-16 PROCEDURE — 1123F ACP DISCUSS/DSCN MKR DOCD: CPT | Performed by: FAMILY MEDICINE

## 2021-06-16 RX ORDER — M-VIT,TX,IRON,MINS/CALC/FOLIC 27MG-0.4MG
1 TABLET ORAL DAILY
COMMUNITY

## 2021-06-16 RX ORDER — ROSUVASTATIN CALCIUM 10 MG/1
10 TABLET, COATED ORAL NIGHTLY
Qty: 30 TABLET | Refills: 3 | Status: SHIPPED | OUTPATIENT
Start: 2021-06-16 | End: 2021-11-29 | Stop reason: SDUPTHER

## 2021-06-16 ASSESSMENT — LIFESTYLE VARIABLES
HOW OFTEN DURING THE LAST YEAR HAVE YOU NEEDED AN ALCOHOLIC DRINK FIRST THING IN THE MORNING TO GET YOURSELF GOING AFTER A NIGHT OF HEAVY DRINKING: 0
HOW OFTEN DO YOU HAVE SIX OR MORE DRINKS ON ONE OCCASION: 1
HOW OFTEN DURING THE LAST YEAR HAVE YOU BEEN UNABLE TO REMEMBER WHAT HAPPENED THE NIGHT BEFORE BECAUSE YOU HAD BEEN DRINKING: 0
HOW OFTEN DO YOU HAVE A DRINK CONTAINING ALCOHOL: 3
AUDIT-C TOTAL SCORE: 4
HOW OFTEN DURING THE LAST YEAR HAVE YOU HAD A FEELING OF GUILT OR REMORSE AFTER DRINKING: 0
HAS A RELATIVE, FRIEND, DOCTOR, OR ANOTHER HEALTH PROFESSIONAL EXPRESSED CONCERN ABOUT YOUR DRINKING OR SUGGESTED YOU CUT DOWN: 0
AUDIT TOTAL SCORE: 4
HOW MANY STANDARD DRINKS CONTAINING ALCOHOL DO YOU HAVE ON A TYPICAL DAY: 0
HAVE YOU OR SOMEONE ELSE BEEN INJURED AS A RESULT OF YOUR DRINKING: 0
HOW OFTEN DURING THE LAST YEAR HAVE YOU FOUND THAT YOU WERE NOT ABLE TO STOP DRINKING ONCE YOU HAD STARTED: 0
HOW OFTEN DURING THE LAST YEAR HAVE YOU FAILED TO DO WHAT WAS NORMALLY EXPECTED FROM YOU BECAUSE OF DRINKING: 0

## 2021-06-16 ASSESSMENT — PATIENT HEALTH QUESTIONNAIRE - PHQ9
SUM OF ALL RESPONSES TO PHQ QUESTIONS 1-9: 0
SUM OF ALL RESPONSES TO PHQ9 QUESTIONS 1 & 2: 0
2. FEELING DOWN, DEPRESSED OR HOPELESS: 0
1. LITTLE INTEREST OR PLEASURE IN DOING THINGS: 0
SUM OF ALL RESPONSES TO PHQ QUESTIONS 1-9: 0
SUM OF ALL RESPONSES TO PHQ QUESTIONS 1-9: 0

## 2021-06-16 NOTE — PATIENT INSTRUCTIONS
Personalized Preventive Plan for Dayan Lee - 6/16/2021  Medicare offers a range of preventive health benefits. Some of the tests and screenings are paid in full while other may be subject to a deductible, co-insurance, and/or copay. Some of these benefits include a comprehensive review of your medical history including lifestyle, illnesses that may run in your family, and various assessments and screenings as appropriate. After reviewing your medical record and screening and assessments performed today your provider may have ordered immunizations, labs, imaging, and/or referrals for you. A list of these orders (if applicable) as well as your Preventive Care list are included within your After Visit Summary for your review. Other Preventive Recommendations:    · A preventive eye exam performed by an eye specialist is recommended every 1-2 years to screen for glaucoma; cataracts, macular degeneration, and other eye disorders. · A preventive dental visit is recommended every 6 months. · Try to get at least 150 minutes of exercise per week or 10,000 steps per day on a pedometer . · Order or download the FREE \"Exercise & Physical Activity: Your Everyday Guide\" from [employer]. Call 7-534.606.1908 or search The [employer] online. · You need 9682-7040 mg of calcium and 1406-8555 IU of vitamin D per day. It is possible to meet your calcium requirement with diet alone, but a vitamin D supplement is usually necessary to meet this goal.  · When exposed to the sun, use a sunscreen that protects against both UVA and UVB radiation with an SPF of 30 or greater. Reapply every 2 to 3 hours or after sweating, drying off with a towel, or swimming. · Always wear a seat belt when traveling in a car. Always wear a helmet when riding a bicycle or motorcycle.

## 2021-06-16 NOTE — PROGRESS NOTES
providers/suppliers regularly involved in providing care):   Patient Care Team:  Wyatt Roberts MD as PCP - General (Family Medicine)  Wyatt Roberts MD as PCP - [facility] Peterson Provider    Wt Readings from Last 3 Encounters:   06/16/21 198 lb 6.4 oz (90 kg)   06/11/20 196 lb 11.2 oz (89.2 kg)   06/04/19 190 lb 12.8 oz (86.5 kg)     Vitals:    06/16/21 0907   BP: 132/84   Site: Right Upper Arm   Position: Sitting   Cuff Size: Large Adult   Pulse: 82   Resp: 18   Weight: 198 lb 6.4 oz (90 kg)   Height: 5' 10\" (1.778 m)     Body mass index is 28.47 kg/m². Based upon direct observation of the patient, evaluation of cognition reveals recent and remote memory intact. Patient's complete Health Risk Assessment and screening values have been reviewed and are found in Flowsheets. The following problems were reviewed today and where indicated follow up appointments were made and/or referrals ordered. Positive Risk Factor Screenings with Interventions:               No Positive Risk Factors identified today.     Personalized Preventive Plan   Current Health Maintenance Status  Immunization History   Administered Date(s) Administered    COVID-19, Moderna, PF, 100mcg/0.5mL 02/11/2021, 03/11/2021    Hepatitis B 01/05/2009, 02/04/2009, 07/06/2009    Influenza, Quadv, adjuvanted, 65 yrs +, IM, PF (Fluad) 10/02/2020    Influenza, Triv, inactivated, subunit, adjuvanted, IM (Fluad 65 yrs and older) 10/18/2019    Pneumococcal Conjugate 13-valent ([id number]) 05/06/2015    Pneumococcal Polysaccharide ([id number]) 12/28/2009, 05/24/2017    Tdap (Boostrix, Adacel) 06/20/2014    Zoster Live (Zostavax) 01/05/2009    Zoster Recombinant (Shingrix) 06/21/2018, 10/22/2018        Health Maintenance   Topic Date Due    Colon cancer screen colonoscopy  04/08/2019    Annual Wellness Visit (AWV)  Never done    A1C test (Diabetic or Prediabetic)  06/09/2022    Potassium monitoring  06/09/2022    Creatinine monitoring 06/09/2022    DTaP/Tdap/Td vaccine (2 - Td or Tdap) 06/20/2024    Lipid screen  06/09/2026    Flu vaccine  Completed    Shingles Vaccine  Completed    Pneumococcal 65+ years Vaccine  Completed    COVID-19 Vaccine  Completed    AAA screen  Completed    Hepatitis C screen  Addressed    Hepatitis A vaccine  Aged Out    Hepatitis B vaccine  Aged Out    Hib vaccine  Aged Out    Meningococcal (ACWY) vaccine  Aged Out     Recommendations for [employer] Due: see orders and patient instructions/AVS.  . Recommended screening schedule for the next 5-10 years is provided to the patient in written form: see Patient Instructions/AVS.    There are no diagnoses linked to this encounter.

## 2021-06-16 NOTE — PROGRESS NOTES
[address], [address] [place] [address] Drive                        Telephone [phone]             Fax [phone]  1945  MRN:  [id number]  Date of visit:  6/16/2021    Subjective:    Nicholas Fox is a 77 y.o. male who presents to [facility] today (6/16/2021) for follow up/evaluation of:  Hyperlipidemia (had labs done 6.9.2021) and Other [employer]. Patient states hes doing well.)      He is here today for a Medicare Wellness Visit, as well as follow up of hyperlipidemia. He has questions about medication for hyperlipidemia. He has not tolerated Lipitor in the past.  He has been taking Zetia, but it is expensive. He has been exercising regularly. He denies chest pain or shortness of breath with activity or at rest.  He has received both doses of the Moderna Covid-19 vaccine. He has the following problem list:  Patient Active Problem List   Diagnosis    Essential hypertension    History of testicular cancer    Family history of thalassemia    Mixed hyperlipidemia    High risk medication use    Elevated fasting glucose        Current medications are:  Outpatient Medications Marked as Taking for the 6/16/21 encounter (Office Visit) with Nori Higgins MD   Medication Sig Dispense Refill    Multiple Vitamins-Minerals (THERAPEUTIC MULTIVITAMIN-MINERALS) tablet Take 1 tablet by mouth daily      ezetimibe (ZETIA) 10 MG tablet take 1 tablet by mouth once daily 120 tablet 1    aspirin 81 MG tablet Take 81 mg by mouth daily. He is allergic to lipitor [atorvastatin]. He is not currently a smoker. He  reports that he has quit smoking. His smoking use included cigarettes.  He has never used smokeless tobacco.      Objective:    Vitals:    06/16/21 0907   BP: 132/84   Site: Right Upper Arm   Position: Sitting   Cuff Size: Large Adult Pulse: 82   Resp: 18   Weight: 198 lb 6.4 oz (90 kg)   Height: 5' 10\" (1.778 m)     Body mass index is 28.47 kg/m². Overweight male, healthy-appearing, alert, cooperative and in no acute distress. Neck supple. No adenopathy. Thyroid symmetric, normal size. Chest:  Normal expansion. Clear to auscultation. No rales, rhonchi, or wheezing. Heart sounds are normal.  Regular rate and rhythm without murmur, gallop or rub. Lower extremities have no edema. Results of labs done 6/9/2021 were reviewed with the patient:   Hospital Outpatient Visit on 06/09/2021   Component Date Value Ref Range Status    Glucose 06/09/2021 117* 70 - 99 mg/dL Final    BUN 06/09/2021 17  8 - 23 mg/dL Final    CREATININE 06/09/2021 1.11  0.70 - 1.20 mg/dL Final    Bun/Cre Ratio 06/09/2021 15  9 - 20 Final    Calcium 06/09/2021 9.6  8.6 - 10.4 mg/dL Final    Sodium 06/09/2021 139  135 - 144 mmol/L Final    Potassium 06/09/2021 4.7  3.7 - 5.3 mmol/L Final    Chloride 06/09/2021 105  98 - 107 mmol/L Final    CO2 06/09/2021 26  20 - 31 mmol/L Final    Anion Gap 06/09/2021 8* 9 - 17 mmol/L Final    Alkaline Phosphatase 06/09/2021 77  40 - 129 U/L Final    ALT 06/09/2021 13  5 - 41 U/L Final    AST 06/09/2021 19  <40 U/L Final    Total Bilirubin 06/09/2021 0.35  0.3 - 1.2 mg/dL Final    Total Protein 06/09/2021 7.3  6.4 - 8.3 g/dL Final    Albumin 06/09/2021 4.3  3.5 - 5.2 g/dL Final    Albumin/Globulin Ratio 06/09/2021 1.4  1.0 - 2.5 Final    GFR Non- 06/09/2021 >60  >60 mL/min Final    GFR  06/09/2021 >60  >60 mL/min Final    GFR Comment 06/09/2021        Final    Comment: Average GFR for 79or more years old:   76 mL/min/1.73sq m  Chronic Kidney Disease:   <60 mL/min/1.73sq m  Kidney failure:   <15 mL/min/1.73sq m              eGFR calculated using average adult body mass.  Additional eGFR calculator available at:        [email or web address]            GFR Staging 06/09/2021 NOT REPORTED   Final    Hemoglobin A1C 06/09/2021 6.1* 4.0 - 6.0 % Final    Estimated Avg Glucose 06/09/2021 128  mg/dL Final    Comment: The ADA and AACC recommend providing the estimated average glucose result to permit better   patient understanding of their HBA1c result.  Cholesterol, Fasting 06/09/2021 206* <200 mg/dL Final    Comment:    Cholesterol Guidelines:      <200  Desirable   200-240  Borderline      >240  Undesirable         HDL 06/09/2021 59  >40 mg/dL Final    Comment:    HDL Guidelines:    <40     Undesirable   40-59    Borderline    >59     Desirable         LDL Cholesterol 06/09/2021 127  0 - 130 mg/dL Final    Comment:    LDL Guidelines:     <100    Desirable   100-129   Near to/above Desirable   130-159   Borderline      >159   Undesirable     Direct (measured) LDL and calculated LDL are not interchangeable tests.  Chol/HDL Ratio 06/09/2021 3.5  <5 Final            Triglyceride, Fasting 06/09/2021 101  <150 mg/dL Final    Comment:    Triglyceride Guidelines:     <150   Desirable   150-199  Borderline   200-499  High     >499   Very high   Based on AHA Guidelines for fasting triglyceride, October 2012.  VLDL 06/09/2021 NOT REPORTED  1 - 30 mg/dL Final         Assessment and Plan:    1. Routine general medical examination at a health care facility  See separate progress note for Medicare Wellness Visit. 2. Mixed hyperlipidemia  His lipid profile was not at goal on his recent lab work. He is interested in trying a different medication than Zetia. After discussion, Crestor was prescribed:  - rosuvastatin (CRESTOR) 10 MG tablet; Take 1 tablet by mouth nightly  Dispense: 30 tablet; Refill: 3    - Lipid Panel; Future was ordered to be done in 3 months. - Lipid Panel; Future was also ordered to be done in one year. 3. Elevated fasting glucose  His fasting glucose and HgbA1c are both elevated, but he does not yet meet criteria to diagnose diabetes. I recommended that he decrease his intake of processed carbohydrates, and continue to exercise regularly. Labs were ordered to be done in 3 months:   - Comprehensive Metabolic Panel; Future  - Hemoglobin A1C; Future    Labs were also ordered to be done prior to his return visit in one year:  - Hemoglobin A1C; Future  - Comprehensive Metabolic Panel; Future    4. Routine health maintenance  Health maintenance was reviewed with the patient. He has received both doses of the Moderna Covid-19 vaccine. Colonoscopy was recommended. A referral was made to general surgery. All other health maintenance, including Shingrix, Tdap, Pneumovax, and Prevnar-13, is up to date at this time.         (Please note that portions of this note were completed with a voice-recognition program. Efforts were made to edit the dictation but occasionally words are mis-transcribed.)

## 2021-09-17 ENCOUNTER — HOSPITAL ENCOUNTER (OUTPATIENT)
Dept: LAB | Age: 76
Discharge: HOME | End: 2021-09-17
Payer: MEDICARE

## 2021-09-17 DIAGNOSIS — E78.2 MIXED HYPERLIPIDEMIA: ICD-10-CM

## 2021-09-17 DIAGNOSIS — R73.01 ELEVATED FASTING GLUCOSE: ICD-10-CM

## 2021-09-17 LAB
ALBUMIN SERPL-MCNC: 4.2 G/DL (ref 3.5–5.2)
ALBUMIN/GLOBULIN RATIO: 1.4 (ref 1–2.5)
ALP BLD-CCNC: 61 U/L (ref 40–129)
ALT SERPL-CCNC: 16 U/L (ref 5–41)
ANION GAP SERPL CALCULATED.3IONS-SCNC: 10 MMOL/L (ref 9–17)
AST SERPL-CCNC: 20 U/L
BILIRUB SERPL-MCNC: 0.22 MG/DL (ref 0.3–1.2)
BUN BLDV-MCNC: 19 MG/DL (ref 8–23)
BUN/CREAT BLD: 20 (ref 9–20)
CALCIUM SERPL-MCNC: 9.5 MG/DL (ref 8.6–10.4)
CHLORIDE BLD-SCNC: 106 MMOL/L (ref 98–107)
CHOLESTEROL/HDL RATIO: 3
CHOLESTEROL: 146 MG/DL
CO2: 25 MMOL/L (ref 20–31)
CREAT SERPL-MCNC: 0.95 MG/DL (ref 0.7–1.2)
ESTIMATED AVERAGE GLUCOSE: 134 MG/DL
GFR AFRICAN AMERICAN: >60 ML/MIN
GFR NON-AFRICAN AMERICAN: >60 ML/MIN
GFR SERPL CREATININE-BSD FRML MDRD: ABNORMAL ML/MIN/{1.73_M2}
GFR SERPL CREATININE-BSD FRML MDRD: ABNORMAL ML/MIN/{1.73_M2}
GLUCOSE BLD-MCNC: 101 MG/DL (ref 70–99)
HBA1C MFR BLD: 6.3 % (ref 4–6)
HDLC SERPL-MCNC: 49 MG/DL
LDL CHOLESTEROL: 86 MG/DL (ref 0–130)
POTASSIUM SERPL-SCNC: 4.6 MMOL/L (ref 3.7–5.3)
SODIUM BLD-SCNC: 141 MMOL/L (ref 135–144)
TOTAL PROTEIN: 7.2 G/DL (ref 6.4–8.3)
TRIGL SERPL-MCNC: 57 MG/DL
VLDLC SERPL CALC-MCNC: NORMAL MG/DL (ref 1–30)

## 2021-09-17 PROCEDURE — 36415 COLL VENOUS BLD VENIPUNCTURE: CPT

## 2021-09-17 PROCEDURE — 80061 LIPID PANEL: CPT

## 2021-09-17 PROCEDURE — 83036 HEMOGLOBIN GLYCOSYLATED A1C: CPT

## 2021-09-17 PROCEDURE — 80053 COMPREHEN METABOLIC PANEL: CPT

## 2021-10-06 ENCOUNTER — IMMUNIZATION (OUTPATIENT)
Dept: LAB | Age: 76
End: 2021-10-06
Payer: MEDICARE

## 2021-10-06 PROCEDURE — PBSHW INFLUENZA, QUADV, ADJUVANTED, 65 YRS +, IM, PF, PREFILL SYR, 0.5ML (FLUAD): Performed by: FAMILY MEDICINE

## 2021-10-06 PROCEDURE — G0008 ADMIN INFLUENZA VIRUS VAC: HCPCS | Performed by: FAMILY MEDICINE

## 2021-11-18 ENCOUNTER — IMMUNIZATION (OUTPATIENT)
Dept: LAB | Age: 76
End: 2021-11-18
Payer: MEDICARE

## 2021-11-18 PROCEDURE — 0064A: CPT

## 2021-11-18 PROCEDURE — 91306 COVID-19, MODERNA BOOSTER VACCINE 0.25ML DOSE: CPT | Performed by: INTERNAL MEDICINE

## 2021-11-18 PROCEDURE — PBSHW COVID-19, MODERNA BOOSTER VACCINE 0.25ML DOSE: Performed by: INTERNAL MEDICINE

## 2021-11-29 DIAGNOSIS — E78.2 MIXED HYPERLIPIDEMIA: ICD-10-CM

## 2021-11-29 RX ORDER — ROSUVASTATIN CALCIUM 10 MG/1
10 TABLET, COATED ORAL NIGHTLY
Qty: 90 TABLET | Refills: 2 | Status: SHIPPED | OUTPATIENT
Start: 2021-11-29 | End: 2022-07-05 | Stop reason: SDUPTHER

## 2021-11-29 NOTE — TELEPHONE ENCOUNTER
Jewels Harper called requesting a refill of the below medication which has been pended for you:     Requested Prescriptions     Pending Prescriptions Disp Refills    rosuvastatin (CRESTOR) 10 MG tablet 90 tablet 1     Sig: Take 1 tablet by mouth nightly       Last Appointment Date: 6/16/2021  Next Appointment Date: 6/21/2022    Allergies   Allergen Reactions    Lipitor [Atorvastatin] Other (See Comments)     Muscle pain

## 2022-05-25 ENCOUNTER — IMMUNIZATION (OUTPATIENT)
Dept: LAB | Age: 77
End: 2022-05-25
Payer: MEDICARE

## 2022-05-25 PROCEDURE — PBSHW COVID-19, MODERNA BOOSTER VACCINE 0.25ML DOSE: Performed by: INTERNAL MEDICINE

## 2022-05-25 PROCEDURE — 90471 IMMUNIZATION ADMIN: CPT | Performed by: INTERNAL MEDICINE

## 2022-06-27 ENCOUNTER — HOSPITAL ENCOUNTER (OUTPATIENT)
Dept: LAB | Age: 77
Discharge: HOME | End: 2022-06-27
Payer: MEDICARE

## 2022-06-27 DIAGNOSIS — E78.2 MIXED HYPERLIPIDEMIA: ICD-10-CM

## 2022-06-27 DIAGNOSIS — R73.01 ELEVATED FASTING GLUCOSE: ICD-10-CM

## 2022-06-27 LAB
ALBUMIN SERPL-MCNC: 4.1 G/DL (ref 3.5–5.2)
ALBUMIN/GLOBULIN RATIO: 1.1 (ref 1–2.5)
ALP BLD-CCNC: 80 U/L (ref 40–129)
ALT SERPL-CCNC: 15 U/L (ref 5–41)
ANION GAP SERPL CALCULATED.3IONS-SCNC: 11 MMOL/L (ref 9–17)
AST SERPL-CCNC: 26 U/L
BILIRUB SERPL-MCNC: 0.4 MG/DL (ref 0.3–1.2)
BUN BLDV-MCNC: 19 MG/DL (ref 8–23)
BUN/CREAT BLD: 20 (ref 9–20)
CALCIUM SERPL-MCNC: 9.4 MG/DL (ref 8.6–10.4)
CHLORIDE BLD-SCNC: 103 MMOL/L (ref 98–107)
CHOLESTEROL/HDL RATIO: 3.1
CHOLESTEROL: 156 MG/DL
CO2: 25 MMOL/L (ref 20–31)
CREAT SERPL-MCNC: 0.93 MG/DL (ref 0.7–1.2)
ESTIMATED AVERAGE GLUCOSE: 128 MG/DL
GFR AFRICAN AMERICAN: >60 ML/MIN
GFR NON-AFRICAN AMERICAN: >60 ML/MIN
GFR SERPL CREATININE-BSD FRML MDRD: ABNORMAL ML/MIN/{1.73_M2}
GLUCOSE BLD-MCNC: 114 MG/DL (ref 70–99)
HBA1C MFR BLD: 6.1 % (ref 4–6)
HDLC SERPL-MCNC: 51 MG/DL
LDL CHOLESTEROL: 85 MG/DL (ref 0–130)
POTASSIUM SERPL-SCNC: 4.5 MMOL/L (ref 3.7–5.3)
SODIUM BLD-SCNC: 139 MMOL/L (ref 135–144)
TOTAL PROTEIN: 7.7 G/DL (ref 6.4–8.3)
TRIGL SERPL-MCNC: 100 MG/DL

## 2022-06-27 PROCEDURE — 36415 COLL VENOUS BLD VENIPUNCTURE: CPT

## 2022-06-27 PROCEDURE — 80053 COMPREHEN METABOLIC PANEL: CPT

## 2022-06-27 PROCEDURE — 83036 HEMOGLOBIN GLYCOSYLATED A1C: CPT

## 2022-06-27 PROCEDURE — 80061 LIPID PANEL: CPT

## 2022-07-05 ENCOUNTER — HOSPITAL ENCOUNTER (OUTPATIENT)
Dept: GENERAL RADIOLOGY | Age: 77
Discharge: HOME | End: 2022-07-07
Payer: MEDICARE

## 2022-07-05 ENCOUNTER — OFFICE VISIT (OUTPATIENT)
Dept: FAMILY MEDICINE CLINIC | Age: 77
End: 2022-07-05
Payer: MEDICARE

## 2022-07-05 VITALS
TEMPERATURE: 97.4 F | HEART RATE: 82 BPM | HEIGHT: 70 IN | DIASTOLIC BLOOD PRESSURE: 70 MMHG | WEIGHT: 197.2 LBS | OXYGEN SATURATION: 95 % | BODY MASS INDEX: 28.23 KG/M2 | SYSTOLIC BLOOD PRESSURE: 128 MMHG

## 2022-07-05 DIAGNOSIS — G89.29 CHRONIC RIGHT SHOULDER PAIN: ICD-10-CM

## 2022-07-05 DIAGNOSIS — H53.2 TRANSIENT DIPLOPIA: ICD-10-CM

## 2022-07-05 DIAGNOSIS — Z12.11 COLON CANCER SCREENING: ICD-10-CM

## 2022-07-05 DIAGNOSIS — M25.511 CHRONIC RIGHT SHOULDER PAIN: ICD-10-CM

## 2022-07-05 DIAGNOSIS — R73.01 ELEVATED FASTING GLUCOSE: ICD-10-CM

## 2022-07-05 DIAGNOSIS — E78.2 MIXED HYPERLIPIDEMIA: ICD-10-CM

## 2022-07-05 DIAGNOSIS — Z00.00 MEDICARE ANNUAL WELLNESS VISIT, SUBSEQUENT: Primary | ICD-10-CM

## 2022-07-05 PROCEDURE — 3017F COLORECTAL CA SCREEN DOC REV: CPT | Performed by: FAMILY MEDICINE

## 2022-07-05 PROCEDURE — G8417 CALC BMI ABV UP PARAM F/U: HCPCS | Performed by: FAMILY MEDICINE

## 2022-07-05 PROCEDURE — 1123F ACP DISCUSS/DSCN MKR DOCD: CPT | Performed by: FAMILY MEDICINE

## 2022-07-05 PROCEDURE — 1036F TOBACCO NON-USER: CPT | Performed by: FAMILY MEDICINE

## 2022-07-05 PROCEDURE — 73030 X-RAY EXAM OF SHOULDER: CPT

## 2022-07-05 PROCEDURE — G8427 DOCREV CUR MEDS BY ELIG CLIN: HCPCS | Performed by: FAMILY MEDICINE

## 2022-07-05 PROCEDURE — 99214 OFFICE O/P EST MOD 30 MIN: CPT | Performed by: FAMILY MEDICINE

## 2022-07-05 PROCEDURE — G0439 PPPS, SUBSEQ VISIT: HCPCS | Performed by: FAMILY MEDICINE

## 2022-07-05 RX ORDER — ROSUVASTATIN CALCIUM 10 MG/1
10 TABLET, COATED ORAL NIGHTLY
Qty: 90 TABLET | Refills: 2 | Status: SHIPPED | OUTPATIENT
Start: 2022-07-05

## 2022-07-05 SDOH — ECONOMIC STABILITY: FOOD INSECURITY: WITHIN THE PAST 12 MONTHS, THE FOOD YOU BOUGHT JUST DIDN'T LAST AND YOU DIDN'T HAVE MONEY TO GET MORE.: NEVER TRUE

## 2022-07-05 SDOH — ECONOMIC STABILITY: FOOD INSECURITY: WITHIN THE PAST 12 MONTHS, YOU WORRIED THAT YOUR FOOD WOULD RUN OUT BEFORE YOU GOT MONEY TO BUY MORE.: NEVER TRUE

## 2022-07-05 ASSESSMENT — PATIENT HEALTH QUESTIONNAIRE - PHQ9
SUM OF ALL RESPONSES TO PHQ QUESTIONS 1-9: 0
4. FEELING TIRED OR HAVING LITTLE ENERGY: 0
SUM OF ALL RESPONSES TO PHQ9 QUESTIONS 1 & 2: 0
5. POOR APPETITE OR OVEREATING: 0
SUM OF ALL RESPONSES TO PHQ QUESTIONS 1-9: 0
SUM OF ALL RESPONSES TO PHQ QUESTIONS 1-9: 0
7. TROUBLE CONCENTRATING ON THINGS, SUCH AS READING THE NEWSPAPER OR WATCHING TELEVISION: 0
8. MOVING OR SPEAKING SO SLOWLY THAT OTHER PEOPLE COULD HAVE NOTICED. OR THE OPPOSITE, BEING SO FIGETY OR RESTLESS THAT YOU HAVE BEEN MOVING AROUND A LOT MORE THAN USUAL: 0
6. FEELING BAD ABOUT YOURSELF - OR THAT YOU ARE A FAILURE OR HAVE LET YOURSELF OR YOUR FAMILY DOWN: 0
10. IF YOU CHECKED OFF ANY PROBLEMS, HOW DIFFICULT HAVE THESE PROBLEMS MADE IT FOR YOU TO DO YOUR WORK, TAKE CARE OF THINGS AT HOME, OR GET ALONG WITH OTHER PEOPLE: 0
3. TROUBLE FALLING OR STAYING ASLEEP: 0
1. LITTLE INTEREST OR PLEASURE IN DOING THINGS: 0
9. THOUGHTS THAT YOU WOULD BE BETTER OFF DEAD, OR OF HURTING YOURSELF: 0
SUM OF ALL RESPONSES TO PHQ QUESTIONS 1-9: 0
2. FEELING DOWN, DEPRESSED OR HOPELESS: 0

## 2022-07-05 ASSESSMENT — SOCIAL DETERMINANTS OF HEALTH (SDOH): HOW HARD IS IT FOR YOU TO PAY FOR THE VERY BASICS LIKE FOOD, HOUSING, MEDICAL CARE, AND HEATING?: NOT HARD AT ALL

## 2022-07-05 ASSESSMENT — LIFESTYLE VARIABLES
HOW OFTEN DO YOU HAVE A DRINK CONTAINING ALCOHOL: MONTHLY OR LESS
HOW MANY STANDARD DRINKS CONTAINING ALCOHOL DO YOU HAVE ON A TYPICAL DAY: 1 OR 2

## 2022-07-05 NOTE — PROGRESS NOTES
[facility] [address], [address]                        Telephone [phone]             Fax [phone]       Zully Rose  :  1945  Age:  77 y.o. MRN:  [id number]  Date of visit:  2022       Assessment and Plan:    1. Medicare annual wellness visit, subsequent  See separate progress note for Medicare Wellness Visit. 2. Mixed hyperlipidemia  His lipid profile was stable on his recent lab work. He is tolerating Crestor well; this was refilled:   - rosuvastatin (CRESTOR) 10 MG tablet; Take 1 tablet by mouth nightly  Dispense: 90 tablet; Refill: 2    Labs were ordered to be done prior to his return visit in one year:   - Comprehensive Metabolic Panel; Future  - Lipid Panel; Future    3. Elevated fasting glucose  His fasting glucose and HgbA1c are both elevated, but he does not yet meet criteria to diagnose diabetes. - Hemoglobin A1C; Future was ordered to be done prior to his return visit in one year. 4. Transient diplopia  I reviewed the results of the Doppler done in  with the patient. I recommended a repeat Doppler:  - VL DUP CAROTID BILATERAL; Future    He will be contacted when the radiologist's interpretation of his x-rays is available. 5. Chronic right shoulder pain  - XR SHOULDER RIGHT (MIN 2 VIEWS); Future was ordered to be done today. He will be contacted when the radiologist's interpretation of his x-rays is available. He was also referred for physical therapy:  - Ambulatory referral to Physical Therapy    6. Routine health maintenance  Health maintenance was reviewed with the patient. He has received four doses of the Moderna Covid-19 vaccine. Annual influenza vaccine was recommended. Colonoscopy was recommended.   He will not agree to schedule a colonoscopy, but he is interested in fecal DNA testing, and a request for a kit was faxed to [facility]. All other health maintenance, including Shingrix, Tdap, Pneumovax, and Prevnar-13, is up to date at this time. Follow up instructions were given to the patient:  Return for Medicare Annual Wellness Visit in 1 year. Subjective:    Angel Foster is a 77 y.o. male who presents to [place] [id number] today (7/5/2022) for follow up/evaluation of: Annual Exam, Medicare AWV, Hyperlipidemia, Hypertension, Other (Elevated fasting glucose), and Shoulder Pain (Patient reports a fall over the winter. Since has had pain/irritation in shoulder. Would like to start therapy in the middle of August due to needing to provide for family until then. Rates pain 2/10)      He is here today for a Medicare Wellness Visit, as well as follow up of hyperlipidemia and elevated glucose. He states that he is tolerating his medications well. Overall, he has been feeling well. He reports pain in the right shoulder that began after he fell on some ice in February. He has been doing some exercises at home, but the pain has persisted. He is interested in beginning physical therapy later this summer. He also reports that he continues to have episodes of double vision. This has been occurring for several years. The last episode was approximately 10 months ago. The episodes will last less than a minute. He states that he has regular eye exams, and the eye exams have always been normal.  He denies any episodes of difficulty speaking or swallowing, or issues with weakness or numbness in one part of the body. He has received four doses of the Moderna Covid-19 vaccine. The most recent dose was 5/25/2022.          Immunization history was reviewed:  Immunization History   Administered Date(s) Administered    COVID-19, MODERNA BLUE border, Primary or Immunocompromised, (age 12y+), IM, 100 mcg/0.5mL 02/11/2021, 03/11/2021    COVID-19, MODERNA Booster BLUE border, (age 18y+), IM, 50mcg/0.25mL 11/18/2021, 05/25/2022    Hepatitis B 01/05/2009, 02/04/2009, 07/06/2009    Influenza, Quadv, adjuvanted, 65 yrs +, IM, PF (Fluad) 10/02/2020, 10/06/2021    Influenza, Triv, inactivated, subunit, adjuvanted, IM (Fluad 65 yrs and older) 10/18/2019    Pneumococcal Conjugate 13-valent ([id number]) 05/06/2015    Pneumococcal Polysaccharide ([id number]) 12/28/2009, 05/24/2017    Tdap (Boostrix, Adacel) 06/20/2014    Zoster Live (Zostavax) 01/05/2009    Zoster Recombinant (Shingrix) 06/21/2018, 10/22/2018          He has the following problem list:  Patient Active Problem List   Diagnosis    Essential hypertension    History of testicular cancer    Family history of thalassemia    Mixed hyperlipidemia    High risk medication use    Elevated fasting glucose        Current medications are:  Outpatient Medications Marked as Taking for the 7/5/22 encounter (Office Visit) with Tod Cazares MD   Medication Sig Dispense Refill    rosuvastatin (CRESTOR) 10 MG tablet Take 1 tablet by mouth nightly 90 tablet 2    Multiple Vitamins-Minerals (THERAPEUTIC MULTIVITAMIN-MINERALS) tablet Take 1 tablet by mouth daily      aspirin 81 MG tablet Take 81 mg by mouth daily. He is allergic to lipitor [atorvastatin]. He is not currently a smoker. He  reports that he has quit smoking. His smoking use included cigarettes. He has never used smokeless tobacco.      Objective:    Vitals:    07/05/22 1643   BP: 128/70   Site: Right Upper Arm   Position: Sitting   Cuff Size: Large Adult   Pulse: 82   Temp: 97.4 °F (36.3 °C)   TempSrc: Temporal   SpO2: 95%   Weight: 197 lb 3.2 oz (89.4 kg)   Height: 5' 10\" (1.778 m)     Body mass index is 28.3 kg/m². Overweight elderly male, healthy-appearing, alert, cooperative and in no acute distress. Neck supple. No adenopathy. Thyroid symmetric, normal size. There are no bruits auscultated bilaterally. Chest:  Normal expansion. Clear to auscultation. No rales, rhonchi, or wheezing. Heart sounds are normal.  Regular rate and rhythm without murmur, gallop or rub. Lower extremities have no edema. There is good range of motion of the right shoulder. There is mild tenderness to palpation of the right shoulder anteriorly. Results of labs done 6/27/2022 were reviewed with the patient:   Hospital Outpatient Visit on 06/27/2022   Component Date Value Ref Range Status    Cholesterol 06/27/2022 156  <200 mg/dL Final    Comment:    Cholesterol Guidelines:      <200  Desirable   200-240  Borderline      >240  Undesirable         HDL 06/27/2022 51  >40 mg/dL Final    Comment:    HDL Guidelines:    <40     Undesirable   40-59    Borderline    >59     Desirable         LDL Cholesterol 06/27/2022 85  0 - 130 mg/dL Final    Comment:    LDL Guidelines:     <100    Desirable   100-129   Near to/above Desirable   130-159   Borderline      >159   Undesirable     Direct (measured) LDL and calculated LDL are not interchangeable tests.  Chol/HDL Ratio 06/27/2022 3.1  <5 Final            Triglycerides 06/27/2022 100  <150 mg/dL Final    Comment:    Triglyceride Guidelines:     <150   Desirable   150-199  Borderline   200-499  High     >499   Very high   Based on AHA Guidelines for fasting triglyceride, October 2012.          Glucose 06/27/2022 114* 70 - 99 mg/dL Final    BUN 06/27/2022 19  8 - 23 mg/dL Final    CREATININE 06/27/2022 0.93  0.70 - 1.20 mg/dL Final    Bun/Cre Ratio 06/27/2022 20  9 - 20 Final    Calcium 06/27/2022 9.4  8.6 - 10.4 mg/dL Final    Sodium 06/27/2022 139  135 - 144 mmol/L Final    Potassium 06/27/2022 4.5  3.7 - 5.3 mmol/L Final    Chloride 06/27/2022 103  98 - 107 mmol/L Final    CO2 06/27/2022 25  20 - 31 mmol/L Final    Anion Gap 06/27/2022 11  9 - 17 mmol/L Final    Alkaline Phosphatase 06/27/2022 80  40 - 129 U/L Final    ALT 06/27/2022 15  5 - 41 U/L Final    AST 06/27/2022 26  <40 U/L Final    Total Bilirubin 06/27/2022 0.40 0.3 - 1.2 mg/dL Final    Total Protein 06/27/2022 7.7  6.4 - 8.3 g/dL Final    Albumin 06/27/2022 4.1  3.5 - 5.2 g/dL Final    Albumin/Globulin Ratio 06/27/2022 1.1  1.0 - 2.5 Final    GFR Non- 06/27/2022 >60  >60 mL/min Final    GFR  06/27/2022 >60  >60 mL/min Final    GFR Comment 06/27/2022        Final    Comment: Average GFR for 79or more years old:   76 mL/min/1.73sq m  Chronic Kidney Disease:   <60 mL/min/1.73sq m  Kidney failure:   <15 mL/min/1.73sq m              eGFR calculated using average adult body mass. Additional eGFR calculator available at:        [email or web address]            Hemoglobin A1C 06/27/2022 6.1* 4.0 - 6.0 % Final    Estimated Avg Glucose 06/27/2022 128  mg/dL Final    Comment: The ADA and AACC recommend providing the estimated average glucose result to permit better   patient understanding of their HBA1c result. The results of the carotid Doppler done 5/30/2014 were reviewed:  IMPRESSION:          A small mixed plaque at the proximal left internal carotid artery near    the carotid bulb resulting in approximately 32 percent luminal narrowing    by visual diameter assessment. There is no hemodynamically significant    stenosis identified on either side.        Antegrade flow is demonstrated in bilateral vertebral arteries.              (Please note that portions of this note were completed with a voice-recognition program. Efforts were made to edit the dictation but occasionally words are mis-transcribed.)

## 2022-07-05 NOTE — PROGRESS NOTES
Patient declines colonoscopy agreeable to cologuard. Patient reports he has an eye exam coming up. Periodically reports double vision. Has not happened in 6 months.

## 2022-07-05 NOTE — PROGRESS NOTES
Medicare Annual Wellness Visit    [facility] is here for Annual Exam, Medicare AWV, Hyperlipidemia, Hypertension, Other (Elevated fasting glucose), and Shoulder Pain (Patient reports a fall over the winter. Since has had pain/irritation in shoulder. Would like to start therapy in the middle of August due to needing to provide for family until then. Rates pain 2/10)    Assessment & Plan   Mixed hyperlipidemia  The following orders have not been finalized:  -     rosuvastatin (CRESTOR) 10 MG tablet  Elevated fasting glucose  Colon cancer screening      Recommendations for Preventive Services Due: see orders and patient instructions/AVS.  Recommended screening schedule for the next 5-10 years is provided to the patient in written form: see Patient Instructions/AVS.     No follow-ups on file. Subjective       Patient's complete Health Risk Assessment and screening values have been reviewed and are found in Flowsheets. The following problems were reviewed today and where indicated follow up appointments were made and/or referrals ordered.     Positive Risk Factor Screenings with Interventions:    Fall Risk:  Do you feel unsteady or are you worried about falling? : (!) yes  2 or more falls in past year?: no  Fall with injury in past year?: (!) yes     Fall Risk Interventions:    · Home safety tips provided               Safety:  Do you have working smoke detectors?: Yes  Do you have any tripping hazards - loose or unsecured carpets or rugs?: No  Do you have any tripping hazards - clutter in doorways, halls, or stairs?: No  Do you have either shower bars, grab bars, non-slip mats or non-slip surfaces in your shower or bathtub?: (!) No  Do all of your stairways have a railing or banister?: Not Applicable  Do you always fasten your seatbelt when you are in a car?: Yes    Safety Interventions:  · Home safety tips provided           Objective   Vitals:    07/05/22 1643   BP: 128/70   Site: Right Upper Arm Position: Sitting   Cuff Size: Large Adult   Pulse: 82   Temp: 97.4 °F (36.3 °C)   TempSrc: Temporal   SpO2: 95%   Weight: 197 lb 3.2 oz (89.4 kg)   Height: 5' 10\" (1.778 m)      Body mass index is 28.3 kg/m². Allergies   Allergen Reactions    Lipitor [Atorvastatin] Other (See Comments)     Muscle pain     Prior to Visit Medications    Medication Sig Taking? Authorizing Provider   rosuvastatin (CRESTOR) 10 MG tablet Take 1 tablet by mouth nightly Yes Peace Vidal MD   Multiple Vitamins-Minerals (THERAPEUTIC MULTIVITAMIN-MINERALS) tablet Take 1 tablet by mouth daily Yes Historical Provider, MD   aspirin 81 MG tablet Take 81 mg by mouth daily.  Yes Historical Provider, MD Garcia (Including outside providers/suppliers regularly involved in providing care):   Patient Care Team:  Peace Vidal MD as PCP - General (Family Medicine)  Peace Vidal MD as PCP - [address] [place] [address] Dr Peterson Provider     Reviewed and updated this visit:  Tobacco  Allergies  Meds  Med Hx  Surg Hx  Soc Hx  Fam Hx

## 2022-07-05 NOTE — PATIENT INSTRUCTIONS
Personalized Preventive Plan for Demetrio Brewer - 7/5/2022  Medicare offers a range of preventive health benefits. Some of the tests and screenings are paid in full while other may be subject to a deductible, co-insurance, and/or copay. Some of these benefits include a comprehensive review of your medical history including lifestyle, illnesses that may run in your family, and various assessments and screenings as appropriate. After reviewing your medical record and screening and assessments performed today your provider may have ordered immunizations, labs, imaging, and/or referrals for you. A list of these orders (if applicable) as well as your Preventive Care list are included within your After Visit Summary for your review. Other Preventive Recommendations:    · A preventive eye exam performed by an eye specialist is recommended every 1-2 years to screen for glaucoma; cataracts, macular degeneration, and other eye disorders. · A preventive dental visit is recommended every 6 months. · Try to get at least 150 minutes of exercise per week or 10,000 steps per day on a pedometer . · Order or download the FREE \"Exercise & Physical Activity: Your Everyday Guide\" from [employer]. Call 4-895.545.6619 or search [employer] online. · You need 4361-4272 mg of calcium and 1782-1181 IU of vitamin D per day. It is possible to meet your calcium requirement with diet alone, but a vitamin D supplement is usually necessary to meet this goal.  · When exposed to the sun, use a sunscreen that protects against both UVA and UVB radiation with an SPF of 30 or greater. Reapply every 2 to 3 hours or after sweating, drying off with a towel, or swimming. · Always wear a seat belt when traveling in a car. Always wear a helmet when riding a bicycle or motorcycle.     Keeping Home a [facility]       As we get older, changes in balance, gait, strength, vision, hearing, and cognition make even the most youthful senior more prone to accidents. Falls are one of the leading health risks for older people. This increased risk of falling is related to:   Aging process (eg, decreased muscle strength, slowed reflexes)   Higher incidence of chronic health problems (eg, arthritis, diabetes) that may limit mobility, agility or sensory awareness   Side effects of medicine (eg, dizziness, blurred vision)especially medicines like prescription pain medicines and drugs used to treat mental health conditions   Depending on the brittleness of your bones, the consequences of a fall can be serious and long lasting. Home Life   Research by the Association of Aging [facility]) shows that some home accidents among older adults can be prevented by making simple lifestyle changes and basic modifications and repairs to the home environment. Here are some lifestyle changes that experts recommend:   Have your hearing and vision checked regularly. Be sure to wear prescription glasses that are right for you. Speak to your doctor or pharmacist about the possible side effects of your medicines. A number of medicines can cause dizziness. If you have problems with sleep, talk to your doctor. Limit your intake of alcohol. If necessary, use a cane or walker to help maintain your balance. Wear supportive, rubber-soled shoes, even at home. If you live in a region that gets wintry weather, you may want to put special cleats on your shoes to prevent you from slipping on the snow and ice. Exercise regularly to help maintain muscle tone, agility, and balance. Always hold the banister when going up or down stairs. Also, use  bars when getting in or out of the bath or shower, or using the toilet. To avoid dizziness, get up slowly from a lying down position. Sit up first, dangling your legs for a minute or two before rising to a standing position.    Overall Home Safety Check   According to the Consumer Product Safety Commision's \"Older Consumer Home Safety Checklist,\" it is important to check for potential hazards in each room. And remember, proper lighting is an essential factor in home safety. If you cannot see clearly, you are more likely to fall. Important questions to ask yourself include:   Are lamp, electric, extension, and telephone cords placed out of the flow of traffic and maintained in good condition? Have frayed cords been replaced? Are all small rugs and runners slip resistant? If not, you can secure them to the floor with a special double-sided carpet tape. Are smoke detectors properly locatedone on every floor of your home and one outside of every sleeping area? Are they in good working order? Are batteries replaced at least once a year? Do you have a well-maintained carbon monoxide detector outside every sleeping are in your home? Does your furniture layout leave plenty of space to maneuver between and around chairs, tables, beds, and sofas? Are hallways, stairs and passages between rooms well lit? Can you reach a lamp without getting out of bed? Are floor surfaces well maintained? Shag rugs, high-pile carpeting, tile floors, and polished wood floors can be particularly slippery. Stairs should always have handrails and be carpeted or fitted with a non-skid tread. Is your telephone easily reachable. Is the cord safely tucked away? Room by Room   According to the Association of Aging, bathrooms and morgan are the two most potentially hazardous rooms in your home. In the Kitchen    Be sure your stove is in proper working order and always make sure burners and the oven are off before you go out or go to sleep. Keep pots on the back burners, turn handles away from the front of the stove, and keep stove clean and free of grease build-up. Kitchen ventilation systems and range exhausts should be working properly.     Keep flammable objects such as towels and pot holders away from the cooking area except when in use. Make sure kitchen curtains are tied back. Move cords and appliances away from the sink and hot surfaces. If extension cords are needed, install wiring guides so they do not hang over the sink, range, or working areas. Look for coffee pots, kettles and toaster ovens with automatic shut-offs. Keep a mop handy in the kitchen so you can wipe up spills instantly. You should also have a small fire extinguisher. Arrange your kitchen with frequently used items on lower shelves to avoid the need to stand on a stepstool to reach them. Make sure countertops are well-lit to avoid injuries while cutting and preparing food. In the Bathroom    Use a non-slip mat or decals in the tub and shower, since wet, soapy tile or porcelain surfaces are extremely slippery. Make sure bathroom rugs are non-skid or tape them firmly to the floor. Bathtubs should have at least one, preferably two, grab bars, firmly attached to structural supports in the wall. (Do not use built-in soap holders or glass shower doors as grab bars.)    Tub seats fitted with non-slip material on the legs allow you to wash sitting down. For people with limited mobility, bathtub transfer benches allow you to slide safely into the tub. Raised toilet seats and toilet safety rails are helpful for those with knee or hip problems. In the [facility]    Make sure you use a nightlight and that the area around your bed is clear of potential obstacles. Be careful with electric blankets and never go to sleep with a heating pad, which can cause serious burns even if on a low setting. Use fire-resistant mattress covers and pillows, and NEVER smoke in bed. Keep a phone next to the bed that is programmed to dial 911 at the push of a button. If you have a chronic condition, you may want to sign on with an automatic call-in service.  Typically the system includes a small pendant that connects directly to an emergency medical voice-response system. You should also make arrangements to stay in contact with someonefriend, neighbor, family memberon a regular schedule. Fire Prevention   According to the [place]. (Smoke Alarms for Every) [facility], senior citizens are one of the two highest risk groups for death and serious injuries due to residential fires. When cooking, wear short-sleeved items, never a bulky long-sleeved robe. The [facility]'s Safety Checklist for Older Consumers emphasizes the importance of checking basements, garages, workshops and storage areas for fire hazards, such as volatile liquids, piles of old rags or clothing and overloaded circuits. Never smoke in bed or when lying down on a couch or recliner chair. Small portable electric or kerosene heaters are responsible for many home fires and should be used cautiously if at all. If you do use one, be sure to keep them away from flammable materials. In case of fire, make sure you have a pre-established emergency exit plan. Have a professional check your fireplace and other fuel-burning appliances yearly. [employer]   Baby boomers entering the shoemaker years will continue to see the development of new products to help older adults live safely and independently in spite of age-related changes. Making Life More Livable  , by [employer], lists over 1,000 products for \"living well in the mature years,\" such as bathing and mobility aids, household security devices, ergonomically designed knives and peelers, and faucet valves and knobs for temperature control. Medical supply stores and organizations are good sources of information about products that improve your quality of life and insure your safety.      Last Reviewed: November 2009 Missy Oliveira MD   Updated: 3/7/2011     ·

## 2022-07-15 LAB — NONINV COLON CA DNA+OCC BLD SCRN STL QL: NEGATIVE

## 2022-07-21 ENCOUNTER — HOSPITAL ENCOUNTER (OUTPATIENT)
Dept: INTERVENTIONAL RADIOLOGY/VASCULAR | Age: 77
Discharge: HOME | End: 2022-07-23
Payer: MEDICARE

## 2022-07-21 DIAGNOSIS — H53.2 TRANSIENT DIPLOPIA: ICD-10-CM

## 2022-07-21 PROCEDURE — 93880 EXTRACRANIAL BILAT STUDY: CPT

## 2022-08-12 ENCOUNTER — HOSPITAL ENCOUNTER (OUTPATIENT)
Dept: PHYSICAL THERAPY | Age: 77
Setting detail: THERAPIES SERIES
Discharge: HOME | End: 2022-08-12
Payer: MEDICARE

## 2022-08-12 PROCEDURE — 97035 APP MDLTY 1+ULTRASOUND EA 15: CPT

## 2022-08-12 PROCEDURE — 97161 PT EVAL LOW COMPLEX 20 MIN: CPT

## 2022-08-12 ASSESSMENT — PAIN DESCRIPTION - ORIENTATION: ORIENTATION: RIGHT

## 2022-08-12 ASSESSMENT — PAIN SCALES - GENERAL: PAINLEVEL_OUTOF10: 2

## 2022-08-12 ASSESSMENT — PAIN DESCRIPTION - ONSET: ONSET: AWAKENED FROM SLEEP

## 2022-08-12 ASSESSMENT — PAIN DESCRIPTION - DESCRIPTORS: DESCRIPTORS: ACHING

## 2022-08-12 ASSESSMENT — PAIN DESCRIPTION - LOCATION: LOCATION: SHOULDER;ELBOW

## 2022-08-12 ASSESSMENT — PAIN DESCRIPTION - FREQUENCY: FREQUENCY: INTERMITTENT

## 2022-08-12 ASSESSMENT — PAIN DESCRIPTION - PAIN TYPE: TYPE: CHRONIC PAIN

## 2022-08-12 NOTE — PLAN OF CARE
[facility]    [x] [place]  Phone: [phone]  Fax: [phone]      [] [place]  Phone: [phone]  Fax: [phone]        To:   Maxx Greenwood MD    Patient: Marleen Nunez  : 1945   MRN: [id number]  Evaluation Date: 2022      Diagnosis Information:    Pain in right shoulder [M25.511]  Other chronic pain [G89.29]          Physical Therapy Certification    Dear Maxx Greenwood MD  The following patient has been evaluated for physical therapy services and for therapy to continue, Medicare requires monthly physician review of the treatment plan. Please review the attached evaluation and/or summary of the patient's plan of care, and verify that you agree therapy should continue by signing the attached document and sending it back to our office.     Plan of Care/Treatment to date:  [x] Therapeutic Exercise    [x] Modalities:  [x] Therapeutic Activity     [] Ultrasound  [] Electrical Stimulation  [] Gait Training      [] Cervical Traction [] Lumbar Traction  [x] Neuromuscular Re-education    [] Cold/hotpack [] Iontophoresis   [x] Instruction in HEP     Other:  [x] Manual Therapy      []             [] Aquatic Therapy      []                 Goals:  Short Term Goals  Time Frame for Short term goals: 3 weeks  Short term goal 1: Initiate HEP    Long Term Goals  Time Frame for Long term goals : 6 weeks  Long term goal 1: Indpendent in HEP  Long term goal 2: Imrpove UE strength to 5/5  Long term goal 3: Improve Spadi to less than 20% Disability  Long term goal 4: Patient to note no sleep disturbances due to Right shoudler    Frequency/Duration:22-22  # Days per week: [] 1 day # Weeks: [] 1 week [] 5 weeks     [x] 2 days   [] 2 weeks [x] 6 weeks     [x] 3 days   [] 3 weeks [] 7 weeks     [] 4 days   [] 4 weeks [] 8 weeks    Rehab Potential: [] Excellent [x] Good [] Fair  [] Poor     Electronically signed by:  Ana Lee PT    If you have any questions or concerns, please don't hesitate to call.   Thank you for your referral.      Physician Signature:________________________________Date:__________________  By signing above, therapists plan is approved by physician

## 2022-08-12 NOTE — FLOWSHEET NOTE
Physical Therapy Daily Treatment Note    Date:  2022    Patient Name:  Marleen Nunez    :  1945  MRN: [id number]  Restrictions/Precautions:     Medical/Treatment Diagnosis Information:    Pain in right shoulder [M25.511]  Other chronic pain [G89.29]       Insurance/Certification information:   Medicare/Med [employer]   Physician Information:   Maxx Greenwood MD  Plan of care signed (Y/N):  n  Visit# / total visits:  1/10  Pain level: /10       Time In: 910  Time Out:950    Progress Note: [x]  Yes  []  No  Next due by: Visit #10      Subjective:   See eval     Objective: See Eval   Observation:   Test measurements:      Exercises:   Exercise/Equipment Resistance/Repetitions Other comments        UBE      Rows/ext     IR/ER/Abd/Flex     Shoulder flex/abd   With wt   Wall pushups                                               [] Provided verbal/tactile cueing for activities related to strengthening, flexibility, endurance, ROM. (15954)  [] Provided verbal/tactile cueing for activities related to improving balance, coordination, kinesthetic sense, posture, motor skill, proprioception. (96603)    Therapeutic Activities:     [] Therapeutic activities, direct (one-on-one) patient contact (use of dynamic activities to improve functional performance). (08800)    Gait:   [] Provided training and instruction to the patient for ambulation re-education. (19573)    Self-Care/ADL's  [] Self-care/home management training and compensatory training, meal preparation, safety procedures, and instructions in use of assistive technology devices/adaptive equipment, direct one-on-one contact.  (98957)    Home Exercise Program:     [] Reviewed/Progressed HEP activities related to strengthening, flexibility, endurance, ROM. (56199)  [] Reviewed/Progressed HEP activities related to improving balance, coordination, kinesthetic sense, posture, motor skill, proprioception.  (51360)    Manual Treatments:    [] Provided manual therapy to mobilize soft tissue/joints for the purpose of modulating pain, promoting relaxation,  increasing ROM, reducing/eliminating soft tissue swelling/inflammation/restriction, improving soft tissue extensibility.  (32280)    Service Based Modalities:      Timed Code Treatment Minutes:   8' [address] 100% 1.0 W/cm2     Total Treatment Minutes:   40    Treatment/Activity Tolerance:  [x] Patient tolerated treatment well [] Patient limited by fatique  [] Patient limited by pain  [] Patient limited by other medical complications  [] Other:     Prognosis: [x] Good [] Fair  [] Poor    Patient Requires Follow-up: [x] Yes  [] No      Goals:  Short Term Goals  Time Frame for Short term goals: 3 weeks  Short term goal 1: Initiate HEP    Long Term Goals  Time Frame for Long term goals : 6 weeks  Long term goal 1: Indpendent in HEP  Long term goal 2: Imrpove UE strength to 5/5  Long term goal 3: Improve Spadi to less than 20% Disability  Long term goal 4: Patient to note no sleep disturbances due to Right shoudler          Plan:   [] Continue per plan of care [] Alter current plan (see comments)  [x] Plan of care initiated [] Hold pending MD visit [] Discharge  Plan for Next Session:      Electronically signed by:  Lj Villareal PT

## 2022-08-12 NOTE — PROGRESS NOTES
Physical Therapy  Initial Assessment  Date: 2022  Patient Name: Marcy Diego  MRN: [id number]  : 1945    Referring Physician: Reshma Sánchez MD     PCP: Tod Cazares MD     Medical Diagnosis: Pain in right shoulder [M25.511]  Other chronic pain [G89.29]    No data recorded    Insurance: Payor: MEDICARE / Plan: MEDICARE PART A AND B / Product Type: *No Product type* /   Insurance ID: [id number] - (Medicare)      Restrictions:       Subjective:   General  Chart Reviewed: Yes  Subjective  Subjective: s/p fall on ice and fell backwards landing on both arms. Prior diagnostic testing[de-identified] X-ray  Pain Screening  Patient Currently in Pain: Yes  Pain Assessment: 0-10  Pain Level: 2  Best Pain Level: 0  Worst Pain Level: 8  Pain Type: Chronic pain  Pain Location: Shoulder, Elbow  Pain Orientation: Right  Pain Descriptors: Aching  Pain Frequency: Intermittent  Pain Onset: Awakened from sleep       Vision/Hearing:       Orientation:       Social History:       Functional Status:       Objective:          AROM RUE (degrees)  RUE AROM : WFL  AROM LUE (degrees)  LUE AROM : WFL    Strength RUE  Strength RUE: Exception  Comment: with pain  R Shoulder Flexion: 4+/5  R Shoulder ABduction: 4+/5  R Shoulder Internal Rotation: 4+/5  R Shoulder External Rotation: 4/5  R Elbow Flexion: 5/5  R Elbow Extension: 5/5  Strength LUE  Comment: 5/5 UE strength     Additional Measures  Special Tests: - lentz, -neer, pain with empty can and full can, - speeds                                               Assessment:    Conditions Requiring Skilled Therapeutic Intervention  Body Structures, Functions, Activity Limitations Requiring Skilled Therapeutic Intervention: Decreased ADL status; Increased pain;Decreased strength  Assessment: Patient to the clinic with Right shoudler pain waking him at night, mild strength deficits.   Therapy Prognosis: Good  Activity Tolerance  Activity Tolerance: Patient tolerated evaluation without incident  Activity Tolerance: Patient tolerated evaluation without incident         Plan:    Plan  Plan weeks: 6 weeks  Current Treatment Recommendations: Strengthening, ROM, Manual Therapy - Soft Tissue Mobilization, Manual Therapy - Joint Manipulation, Pain management, Home exercise program, Integrated dry needling, Modalities    G-Code:         OutComes Score:                            SPADI Total Pain Score : 48 % (08/12/22 0919)  SPADI Total Disability Score  : 25 % (08/12/22 0919)  SPADI Total Score : 44 (08/12/22 0919)                        AM-PAC Score:             Goals:  Short Term Goals  Time Frame for Short term goals: 3 weeks  Short term goal 1: Initiate HEP  STG Goal 1 Status[de-identified] New  Long Term Goals  Time Frame for Long term goals : 6 weeks  Long term goal 1: Indpendent in HEP  LTG Goal 1 Status[de-identified] New  Long term goal 2: Imrpove UE strength to 5/5  LTG Goal 2 Status[de-identified] New  Long term goal 3: Improve Spadi to less than 20% Disability  LTG Goal 3 Status[de-identified] New  Long term goal 4: Patient to note no sleep disturbances due to Right shoudler  LTG Goal 4 Status[de-identified] New  Patient Goals   Patient goals : Decrease Pain       Therapy Time:   Individual Concurrent Group Co-treatment   Time In 0910         Time Out 0950         Minutes 40         Timed Code Treatment Minutes: 8 Minutes       Sly De PT

## 2022-08-16 ENCOUNTER — HOSPITAL ENCOUNTER (OUTPATIENT)
Dept: PHYSICAL THERAPY | Age: 77
Setting detail: THERAPIES SERIES
Discharge: HOME | End: 2022-08-16
Payer: MEDICARE

## 2022-08-16 PROCEDURE — 97110 THERAPEUTIC EXERCISES: CPT

## 2022-08-16 PROCEDURE — 97035 APP MDLTY 1+ULTRASOUND EA 15: CPT

## 2022-08-16 NOTE — PROGRESS NOTES
Physical Therapy Daily Treatment Note    Date:  2022    Patient Name:  Isiah Manuel \"Oneil\"   :  1945  MRN: [id number]  Restrictions/Precautions:     Medical/Treatment Diagnosis Information:    Pain in right shoulder [M25.511]  Other chronic pain [G89.29]       Insurance/Certification information:   Medicare/Med [place]   Physician Information:   Tanya Scott MD  Plan of care signed (Y/N):  y  Visit# / total visits:  2/10  Pain level: 4/10       Time In: 9:31  Time Out:10:13    Progress Note: []  Yes  [x]  No  Next due by: Visit #10      Subjective:   Pt reports shoulder is sore. Pt states at times will receive shooting pain down UE however usually just stays in 1 spot in anterior shoulder. Pt states US helped some last session. Pt states sits on rowing machine 3x a week 30 minutes    Objective: SYLVIA performed per flow sheet for improved mobility and strengthening for improvement in daily living activities. Verbal cueing for sequencing and proper form. HEP given with green Tband. Pt verbalized understanding. Pt left with no pain    Observation:   Test measurements:      Exercises:   Exercise/Equipment Resistance/Repetitions Other comments        UBE  3' fwd, 3' retro    Rows/ext 12x green    IR/ER/Abd/Flex 12x green    Shoulder flex/abd  12x   3# With wt   Wall pushups  12x    Body Blade  5' Multi direction              SL ER, abd 12x  2#    Prone Cuff Series 12x  2#    Scapular punches 12x 3#    ABCs 1x 3#         [] Provided verbal/tactile cueing for activities related to strengthening, flexibility, endurance, ROM. (88033)  [] Provided verbal/tactile cueing for activities related to improving balance, coordination, kinesthetic sense, posture, motor skill, proprioception. (20235)    Therapeutic Activities:     [] Therapeutic activities, direct (one-on-one) patient contact (use of dynamic activities to improve functional performance).  (12252)    Gait:   [] Provided training and instruction to the patient for ambulation re-education. (40995)    Self-Care/ADL's  [] Self-care/home management training and compensatory training, meal preparation, safety procedures, and instructions in use of assistive technology devices/adaptive equipment, direct one-on-one contact. (02233)    Home Exercise Program:   Tband rows/ext, 6 way Tband , Flex/abd to 90  [x] Reviewed/Progressed HEP activities related to strengthening, flexibility, endurance, ROM. (55037)  [] Reviewed/Progressed HEP activities related to improving balance, coordination, kinesthetic sense, posture, motor skill, proprioception.  (96622)    Manual Treatments:    [] Provided manual therapy to mobilize soft tissue/joints for the purpose of modulating pain, promoting relaxation,  increasing ROM, reducing/eliminating soft tissue swelling/inflammation/restriction, improving soft tissue extensibility.  (73160)    Service Based Modalities:      Timed Code Treatment Minutes:   8' [address] 100% 1.0 W/cm2                                                            34' therex    Total Treatment Minutes:   43'    Treatment/Activity Tolerance:  [x] Patient tolerated treatment well [] Patient limited by fatique  [] Patient limited by pain  [] Patient limited by other medical complications  [] Other:     Prognosis: [x] Good [] Fair  [] Poor    Patient Requires Follow-up: [x] Yes  [] No      Goals:  Short Term Goals  Time Frame for Short term goals: 3 weeks  Short term goal 1: Initiate HEP (initiated 8/16/22)    Long Term Goals  Time Frame for Long term goals : 6 weeks  Long term goal 1: Indpendent in HEP  Long term goal 2: Imrpove UE strength to 5/5  Long term goal 3: Improve Spadi to less than 20% Disability  Long term goal 4: Patient to note no sleep disturbances due to Right shoudler          Plan:   [x] Continue per plan of care [] Alter current plan (see comments)  [] Plan of care initiated [] Hold pending MD visit [] Discharge  Plan for Next Session:      Electronically signed by:  Kaden Barrera, PTA

## 2022-08-18 ENCOUNTER — HOSPITAL ENCOUNTER (OUTPATIENT)
Dept: PHYSICAL THERAPY | Age: 77
Setting detail: THERAPIES SERIES
Discharge: HOME | End: 2022-08-18
Payer: MEDICARE

## 2022-08-18 PROCEDURE — 97110 THERAPEUTIC EXERCISES: CPT

## 2022-08-18 PROCEDURE — 97035 APP MDLTY 1+ULTRASOUND EA 15: CPT

## 2022-08-18 NOTE — PROGRESS NOTES
Physical Therapy Daily Treatment Note    Date:  2022    Patient Name:  Courtney Patient \"Oneil\"   :  1945  MRN: [id number]  Restrictions/Precautions:     Medical/Treatment Diagnosis Information:    Pain in right shoulder [M25.511]  Other chronic pain [G89.29]       Insurance/Certification information:   Medicare/Med [place]   Physician Information:   Fallon Walker MD  Plan of care signed (Y/N):  y  Visit# / total visits:  3/10  Pain level: 4/10       Time In: 230  Time Out:312    Progress Note: []  Yes  [x]  No  Next due by: Visit #10      Subjective:   Pt reports shoulder sore this morning, but went away after exs. Patient noting no pain currently. Objective: SYLVIA performed per flow sheet for improved mobility and strengthening for improvement in daily living activities. Verbal cueing for sequencing and proper form. Moderate fatigue noted with body blade. Progressed resistence with several exs this date. Observation:   Test measurements:      Exercises:   Exercise/Equipment Resistance/Repetitions Other comments        UBE  3' fwd, 3' retro    Rows/ext 15x blk    IR/ER/Abd/Flex 15x blk     Shoulder flex/abd  15x   3# With wt   Wall pushups  15x    Body Blade  5' Multi direction              SL ER, abd 12x  3#    Prone Cuff Series 12x  3#    Scapular punches 12x 3#    ABCs 1x 3#         [] Provided verbal/tactile cueing for activities related to strengthening, flexibility, endurance, ROM. (03184)  [] Provided verbal/tactile cueing for activities related to improving balance, coordination, kinesthetic sense, posture, motor skill, proprioception. (16495)    Therapeutic Activities:     [] Therapeutic activities, direct (one-on-one) patient contact (use of dynamic activities to improve functional performance). (45473)    Gait:   [] Provided training and instruction to the patient for ambulation re-education.  (03532)    Self-Care/ADL's  [] Self-care/home management training and compensatory training, meal preparation, safety procedures, and instructions in use of assistive technology devices/adaptive equipment, direct one-on-one contact. (10455)    Home Exercise Program:   Tband rows/ext, 6 way Tband , Flex/abd to 90  [x] Reviewed/Progressed HEP activities related to strengthening, flexibility, endurance, ROM. (34024)  [] Reviewed/Progressed HEP activities related to improving balance, coordination, kinesthetic sense, posture, motor skill, proprioception.  (89183)    Manual Treatments:    [] Provided manual therapy to mobilize soft tissue/joints for the purpose of modulating pain, promoting relaxation,  increasing ROM, reducing/eliminating soft tissue swelling/inflammation/restriction, improving soft tissue extensibility.  (67287)    Service Based Modalities:      Timed Code Treatment Minutes:   8' [address] 100% 1.0 W/cm2                                                            34' therex    Total Treatment Minutes:   43'    Treatment/Activity Tolerance:  [x] Patient tolerated treatment well [] Patient limited by fatique  [] Patient limited by pain  [] Patient limited by other medical complications  [] Other:     Prognosis: [x] Good [] Fair  [] Poor    Patient Requires Follow-up: [x] Yes  [] No      Goals:  Short Term Goals  Time Frame for Short term goals: 3 weeks  Short term goal 1: Initiate HEP (initiated 8/16/22)    Long Term Goals  Time Frame for Long term goals : 6 weeks  Long term goal 1: Indpendent in HEP  Long term goal 2: Imrpove UE strength to 5/5  Long term goal 3: Improve Spadi to less than 20% Disability  Long term goal 4: Patient to note no sleep disturbances due to Right shoudler          Plan:   [x] Continue per plan of care [] Alter current plan (see comments)  [] Plan of care initiated [] Hold pending MD visit [] Discharge  Plan for Next Session:      Electronically signed by:  Gilford Redwood, PT

## 2022-08-23 ENCOUNTER — HOSPITAL ENCOUNTER (OUTPATIENT)
Dept: PHYSICAL THERAPY | Age: 77
Setting detail: THERAPIES SERIES
Discharge: HOME | End: 2022-08-23
Payer: MEDICARE

## 2022-08-23 PROCEDURE — 97110 THERAPEUTIC EXERCISES: CPT

## 2022-08-23 PROCEDURE — 97035 APP MDLTY 1+ULTRASOUND EA 15: CPT

## 2022-08-23 NOTE — PROGRESS NOTES
Physical Therapy Daily Treatment Note    Date:  2022    Patient Name:  Ne Bustamante \"Oneil\"   :  1945  MRN: [id number]  Restrictions/Precautions:     Medical/Treatment Diagnosis Information:    Pain in right shoulder [M25.511]  Other chronic pain [G89.29]       Insurance/Certification information:   Medicare/Med mutual   Physician Information:   Fouzia Castro MD  Plan of care signed (Y/N):  y  Visit# / total visits:  4/10  Pain level: 3/10       Time In: 9:32  Time Out:10:13    Progress Note: []  Yes  [x]  No  Next due by: Visit #10      Subjective:   Pt reports shoulder is improving some with slight pain only this date. Pt states only had to take one aspirin yesterday. Pt reports HEP compliance. Pt states most pain and difficulty is with eccentric control from overhead activities. Pt states sleeping is a lot better also     Objective: SYLVIA performed per flow sheet for improved mobility and strengthening for improvement in daily living activities. Verbal cueing for sequencing and proper form. Moderate fatigue noted with body blade. Initiated and progressed several exercises this date.     Observation:   Test measurements:  R shoulder flexion: 5-/5    Exercises:   Exercise/Equipment Resistance/Repetitions Other comments        UBE  3' fwd, 3' retro    Rows/ext 15x blk    IR/ER/Abd/Flex 15x blk     Shoulder flex/abd  15x   3# With wt   Wall pushups  15x    Body Blade  5' Multi direction   Counter to shelf 10x 2# Work on eccentric control, initiated   Cybex 7                SL ER, abd 15x  3# adv   Prone Cuff Series 15x  3# adv   Scapular punches 15x 3# adv   ABCs 1x 3#         [] Provided verbal/tactile cueing for activities related to strengthening, flexibility, endurance, ROM. (08835)  [] Provided verbal/tactile cueing for activities related to improving balance, coordination, kinesthetic sense, posture, motor skill, proprioception. (25171)    Therapeutic Activities:     [] Therapeutic activities, direct (one-on-one) patient contact (use of dynamic activities to improve functional performance). (41635)    Gait:   [] Provided training and instruction to the patient for ambulation re-education. (42236)    Self-Care/ADL's  [] Self-care/home management training and compensatory training, meal preparation, safety procedures, and instructions in use of assistive technology devices/adaptive equipment, direct one-on-one contact. (05540)    Home Exercise Program:   Tband rows/ext, 6 way Tband , Flex/abd to 90  [x] Reviewed/Progressed HEP activities related to strengthening, flexibility, endurance, ROM. (46002)  [] Reviewed/Progressed HEP activities related to improving balance, coordination, kinesthetic sense, posture, motor skill, proprioception.  (95209)    Manual Treatments:    [] Provided manual therapy to mobilize soft tissue/joints for the purpose of modulating pain, promoting relaxation,  increasing ROM, reducing/eliminating soft tissue swelling/inflammation/restriction, improving soft tissue extensibility.  (07111)    Service Based Modalities:      Timed Code Treatment Minutes:   8' [address] 100% 1.0 W/cm2                                                            33' therex    Total Treatment Minutes:   39'    Treatment/Activity Tolerance:  [x] Patient tolerated treatment well [] Patient limited by fatique  [] Patient limited by pain  [] Patient limited by other medical complications  [] Other:     Prognosis: [x] Good [] Fair  [] Poor    Patient Requires Follow-up: [x] Yes  [] No      Goals:  Short Term Goals  Time Frame for Short term goals: 3 weeks  Short term goal 1: Initiate HEP (initiated 8/16/22)    Long Term Goals  Time Frame for Long term goals : 6 weeks  Long term goal 1: Indpendent in HEP  Long term goal 2: Imrpove UE strength to 5/5 (see above)  Long term goal 3: Improve Spadi to less than 20% Disability  Long term goal 4: Patient to note no sleep disturbances due to Right kenisha          Plan:   [x] Continue per plan of care [] Alter current plan (see comments)  [] Plan of care initiated [] Hold pending MD visit [] Discharge  Plan for Next Session:      Electronically signed by:  Angelito Box PTA

## 2022-08-25 ENCOUNTER — HOSPITAL ENCOUNTER (OUTPATIENT)
Dept: PHYSICAL THERAPY | Age: 77
Setting detail: THERAPIES SERIES
Discharge: HOME | End: 2022-08-25
Payer: MEDICARE

## 2022-08-25 PROCEDURE — 97110 THERAPEUTIC EXERCISES: CPT

## 2022-08-25 PROCEDURE — 97035 APP MDLTY 1+ULTRASOUND EA 15: CPT

## 2022-08-25 NOTE — PROGRESS NOTES
Physical Therapy Daily Treatment Note    Date:  2022    Patient Name:  Marleen Nunez \"Oneil\"   :  1945  MRN: [id number]  Restrictions/Precautions:     Medical/Treatment Diagnosis Information:    Pain in right shoulder [M25.511]  Other chronic pain [G89.29]       Insurance/Certification information:   Medicare/Med [place]   Physician Information:   Maxx Greenwood MD  Plan of care signed (Y/N):  y  Visit# / total visits:  5/10  Pain level: 1-2/10       Time In: 8:52  Time Out:10:30    Progress Note: []  Yes  [x]  No  Next due by: Visit #10      Subjective:   Pt reports shoulder still a little sore. Pt reports feels like strength has been returning again. Objective: SYLVIA performed per flow sheet for improved mobility and strengthening for improvement in daily living activities. Verbal cueing for sequencing and proper form. Moderate fatigue noted with body blade. Initiated and progressed several exercises this date. Observation:   Test measurements:      Exercises:   Exercise/Equipment Resistance/Repetitions Other comments        UBE  3' fwd, 3' retro L2   Rows/ext 15x blk    IR/ER/Abd/Flex 15x blk     Shoulder flex/abd  10x   4# With wt      Wall pushups  20x    Body Blade  5' Multi direction   Counter to shelf 15x 2# Work on eccentric control,    Cybex 7 10x 30#    light Vertical handles this date              SL ER, abd 10x  4#    Prone Cuff Series 15x  4#    Scapular punches 15x 4#    ABCs 1x 4#         [] Provided verbal/tactile cueing for activities related to strengthening, flexibility, endurance, ROM. (11114)  [] Provided verbal/tactile cueing for activities related to improving balance, coordination, kinesthetic sense, posture, motor skill, proprioception. (07483)    Therapeutic Activities:     [] Therapeutic activities, direct (one-on-one) patient contact (use of dynamic activities to improve functional performance).  (57508)    Gait:   [] Provided training and instruction to the patient for ambulation re-education. (46680)    Self-Care/ADL's  [] Self-care/home management training and compensatory training, meal preparation, safety procedures, and instructions in use of assistive technology devices/adaptive equipment, direct one-on-one contact. (18070)    Home Exercise Program:   Tband rows/ext, 6 way Tband , Flex/abd to 90  [x] Reviewed/Progressed HEP activities related to strengthening, flexibility, endurance, ROM. (02924)  [] Reviewed/Progressed HEP activities related to improving balance, coordination, kinesthetic sense, posture, motor skill, proprioception.  (68182)    Manual Treatments:    [] Provided manual therapy to mobilize soft tissue/joints for the purpose of modulating pain, promoting relaxation,  increasing ROM, reducing/eliminating soft tissue swelling/inflammation/restriction, improving soft tissue extensibility.  (01693)    Service Based Modalities:      Timed Code Treatment Minutes:   8' [address] 100% 1.0 W/cm2                                                            30' therex    Total Treatment Minutes:   45'    Treatment/Activity Tolerance:  [x] Patient tolerated treatment well [] Patient limited by fatique  [] Patient limited by pain  [] Patient limited by other medical complications  [] Other:     Prognosis: [x] Good [] Fair  [] Poor    Patient Requires Follow-up: [x] Yes  [] No      Goals:  Short Term Goals  Time Frame for Short term goals: 3 weeks  Short term goal 1: Initiate HEP (initiated 8/16/22)    Long Term Goals  Time Frame for Long term goals : 6 weeks  Long term goal 1: Indpendent in HEP  Long term goal 2: Imrpove UE strength to 5/5 (see above)  Long term goal 3: Improve Spadi to less than 20% Disability  Long term goal 4: Patient to note no sleep disturbances due to Right shoudler          Plan:   [x] Continue per plan of care [] Alter current plan (see comments)  [] Plan of care initiated [] Hold pending MD visit [] Discharge  Plan for Next Session: Electronically signed by:  Adis Ramsey PTA

## 2022-08-29 NOTE — PROGRESS NOTES
I have reviewed and agree to the content of the note written by the PTA.   Electronically signed by Flavio Davis PT [id number]

## 2022-08-30 ENCOUNTER — HOSPITAL ENCOUNTER (OUTPATIENT)
Dept: PHYSICAL THERAPY | Age: 77
Setting detail: THERAPIES SERIES
Discharge: HOME | End: 2022-08-30
Payer: MEDICARE

## 2022-08-30 PROCEDURE — 97110 THERAPEUTIC EXERCISES: CPT

## 2022-08-30 PROCEDURE — 97035 APP MDLTY 1+ULTRASOUND EA 15: CPT

## 2022-08-30 NOTE — PROGRESS NOTES
Physical Therapy Daily Treatment Note    Date:  2022    Patient Name:  Thi Speaks \"Oneil\"   :  1945  MRN: [id number]  Restrictions/Precautions:     Medical/Treatment Diagnosis Information:    Pain in right shoulder [M25.511]  Other chronic pain [G89.29]       Insurance/Certification information:   Medicare/Med mutual   Physician Information:   Kita Cunningham MD  Plan of care signed (Y/N):  y  Visit# / total visits:  6/10  Pain level: 2.5/10       Time In: 9:35  Time Out:10:13    Progress Note: []  Yes  [x]  No  Next due by: Visit #10      Subjective:   Pt reports pain in shoulder was bad this weekend but feeling better now. Pt having more pain in anterior shoulder however stating was doing a lot of mowing and work. Objective: SYLVIA performed per flow sheet for improved mobility and strengthening for improvement in daily living activities. Verbal cueing for sequencing and proper form. Difficulty with eccentric control. Observation:   Test measurements:  SPADI: 22/130 = 16.9% disability    Exercises:   Exercise/Equipment Resistance/Repetitions Other comments        UBE  3' fwd, 3' retro L2   Rows/ext 15x blk    IR/ER/Abd/Flex 15x blk     Shoulder flex/abd  15x   4# With wt      Wall pushups  20x    Body Blade  5' Multi direction   Counter to shelf 15x 2# Work on eccentric control,                    SL ER, abd 10x  4#    Prone Cuff Series 15x  4#    Scapular punches 15x 4#    ABCs 1x 4#         [] Provided verbal/tactile cueing for activities related to strengthening, flexibility, endurance, ROM. (71005)  [] Provided verbal/tactile cueing for activities related to improving balance, coordination, kinesthetic sense, posture, motor skill, proprioception. (18109)    Therapeutic Activities:     [] Therapeutic activities, direct (one-on-one) patient contact (use of dynamic activities to improve functional performance).  (09815)    Gait:   [] Provided training and instruction to the patient for ambulation re-education. (91232)    Self-Care/ADL's  [] Self-care/home management training and compensatory training, meal preparation, safety procedures, and instructions in use of assistive technology devices/adaptive equipment, direct one-on-one contact. (43985)    Home Exercise Program:   Tband rows/ext, 6 way Tband , Flex/abd to 90  [x] Reviewed/Progressed HEP activities related to strengthening, flexibility, endurance, ROM. (33681)  [] Reviewed/Progressed HEP activities related to improving balance, coordination, kinesthetic sense, posture, motor skill, proprioception.  (18776)    Manual Treatments:    [] Provided manual therapy to mobilize soft tissue/joints for the purpose of modulating pain, promoting relaxation,  increasing ROM, reducing/eliminating soft tissue swelling/inflammation/restriction, improving soft tissue extensibility.  (62342)    Service Based Modalities:      Timed Code Treatment Minutes:   8' [address] 100% 1.0 W/cm2                                                            30' therex    Total Treatment Minutes:   45'    Treatment/Activity Tolerance:  [x] Patient tolerated treatment well [] Patient limited by fatique  [] Patient limited by pain  [] Patient limited by other medical complications  [] Other:     Prognosis: [x] Good [] Fair  [] Poor    Patient Requires Follow-up: [x] Yes  [] No      Goals:  Short Term Goals  Time Frame for Short term goals: 3 weeks  Short term goal 1: Initiate HEP (initiated 8/16/22)    Long Term Goals  Time Frame for Long term goals : 6 weeks  Long term goal 1: Indpendent in HEP  Long term goal 2: Imrpove UE strength to 5/5   Long term goal 3: Improve Spadi to less than 20% Disability (see above)  Long term goal 4: Patient to note no sleep disturbances due to Right shoudler          Plan:   [x] Continue per plan of care [] Alter current plan (see comments)  [] Plan of care initiated [] Hold pending MD visit [] Discharge  Plan for Next Session: Electronically signed by:  Oswald Gordillo, PTA

## 2022-09-01 ENCOUNTER — HOSPITAL ENCOUNTER (OUTPATIENT)
Dept: PHYSICAL THERAPY | Age: 77
Setting detail: THERAPIES SERIES
Discharge: HOME | End: 2022-09-01
Payer: MEDICARE

## 2022-09-01 PROCEDURE — 97035 APP MDLTY 1+ULTRASOUND EA 15: CPT

## 2022-09-01 PROCEDURE — 97110 THERAPEUTIC EXERCISES: CPT

## 2022-09-01 NOTE — PROGRESS NOTES
Physical Therapy Daily Treatment Note    Date:  2022    Patient Name:  Jose Rivera \"Oneil\"   :  1945  MRN: [id number]  Restrictions/Precautions:     Medical/Treatment Diagnosis Information:    Pain in right shoulder [M25.511]  Other chronic pain [G89.29]       Insurance/Certification information:   Medicare/Med mutual   Physician Information:   Margarito Padilla MD  Plan of care signed (Y/N):  y  Visit# / total visits:  7/10  Pain level: 1/10       Time In: 9:43  Time Out:10:23    Progress Note: []  Yes  [x]  No  Next due by: Visit #10      Subjective:   Pt reports shoulder is doing good this date. Pt reports shoulder had some fatigue after last session but has very little pain today. Objective: SYLVIA performed per flow sheet for improved mobility and strengthening for improvement in daily living activities. Verbal cueing for sequencing and proper form. Difficulty with eccentric control. Added/Advanced exercises with good tolerance. Pt reports relief at end of session    Observation:   Test measurements:      Exercises:   Exercise/Equipment Resistance/Repetitions Other comments        UBE  3' fwd, 3' retro L2   Rows/ext 20x blk    IR/ER/Abd/Flex 20x blk     Shoulder flex/abd  2x10  4# With wt      Wall pushups  20x    Body Blade  5' Multi direction   Counter to shelf 10x 3# Work on eccentric control,    Tricep Ext 10x 4#               SL ER, abd 15x  4#    Prone Cuff Series 15x  4#    Scapular punches 20x 4#    ABCs 1x 4#         [] Provided verbal/tactile cueing for activities related to strengthening, flexibility, endurance, ROM. (91528)  [] Provided verbal/tactile cueing for activities related to improving balance, coordination, kinesthetic sense, posture, motor skill, proprioception. (15861)    Therapeutic Activities:     [] Therapeutic activities, direct (one-on-one) patient contact (use of dynamic activities to improve functional performance).  (14013)    Gait:   [] Provided training and instruction to the patient for ambulation re-education. (26799)    Self-Care/ADL's  [] Self-care/home management training and compensatory training, meal preparation, safety procedures, and instructions in use of assistive technology devices/adaptive equipment, direct one-on-one contact. (84901)    Home Exercise Program:   Tband rows/ext, 6 way Tband , Flex/abd to 90  [x] Reviewed/Progressed HEP activities related to strengthening, flexibility, endurance, ROM. (48328)  [] Reviewed/Progressed HEP activities related to improving balance, coordination, kinesthetic sense, posture, motor skill, proprioception.  (42776)    Manual Treatments:    [] Provided manual therapy to mobilize soft tissue/joints for the purpose of modulating pain, promoting relaxation,  increasing ROM, reducing/eliminating soft tissue swelling/inflammation/restriction, improving soft tissue extensibility.  (18997)    Service Based Modalities:      Timed Code Treatment Minutes:   8' [address] 100% 1.0 W/cm2                                                            32' therex    Total Treatment Minutes:   36'    Treatment/Activity Tolerance:  [x] Patient tolerated treatment well [] Patient limited by fatique  [] Patient limited by pain  [] Patient limited by other medical complications  [] Other:     Prognosis: [x] Good [] Fair  [] Poor    Patient Requires Follow-up: [x] Yes  [] No      Goals:  Short Term Goals  Time Frame for Short term goals: 3 weeks  Short term goal 1: Initiate HEP (initiated 8/16/22)    Long Term Goals  Time Frame for Long term goals : 6 weeks  Long term goal 1: Indpendent in HEP  Long term goal 2: Imrpove UE strength to 5/5   Long term goal 3: Improve Spadi to less than 20% Disability  Long term goal 4: Patient to note no sleep disturbances due to Right shoudler          Plan:   [x] Continue per plan of care [] Alter current plan (see comments)  [] Plan of care initiated [] Hold pending MD visit [] Discharge  Plan for Next Session: Electronically signed by:  Kaden Barrera, PTA

## 2022-09-06 ENCOUNTER — HOSPITAL ENCOUNTER (OUTPATIENT)
Dept: PHYSICAL THERAPY | Age: 77
Setting detail: THERAPIES SERIES
Discharge: HOME | End: 2022-09-06
Payer: MEDICARE

## 2022-09-06 PROCEDURE — 97110 THERAPEUTIC EXERCISES: CPT

## 2022-09-06 NOTE — PROGRESS NOTES
related to strengthening, flexibility, endurance, ROM. (92361)  [] Provided verbal/tactile cueing for activities related to improving balance, coordination, kinesthetic sense, posture, motor skill, proprioception. (54572)    Therapeutic Activities:     [] Therapeutic activities, direct (one-on-one) patient contact (use of dynamic activities to improve functional performance). (34875)    Gait:   [] Provided training and instruction to the patient for ambulation re-education. (41946)    Self-Care/ADL's  [] Self-care/home management training and compensatory training, meal preparation, safety procedures, and instructions in use of assistive technology devices/adaptive equipment, direct one-on-one contact. (19132)    Home Exercise Program:   Tband rows/ext, 6 way Tband , Flex/abd to 90  [x] Reviewed/Progressed HEP activities related to strengthening, flexibility, endurance, ROM. (46593)  [] Reviewed/Progressed HEP activities related to improving balance, coordination, kinesthetic sense, posture, motor skill, proprioception.  (83857)    Manual Treatments:    [] Provided manual therapy to mobilize soft tissue/joints for the purpose of modulating pain, promoting relaxation,  increasing ROM, reducing/eliminating soft tissue swelling/inflammation/restriction, improving soft tissue extensibility.  (95601)    Service Based Modalities:      Timed Code Treatment Minutes:                                                            44' therex    Total Treatment Minutes:   44'    Treatment/Activity Tolerance:  [x] Patient tolerated treatment well [] Patient limited by fatique  [] Patient limited by pain  [] Patient limited by other medical complications  [] Other:     Prognosis: [x] Good [] Fair  [] Poor    Patient Requires Follow-up: [x] Yes  [] No      Goals:  Short Term Goals  Time Frame for Short term goals: 3 weeks  Short term goal 1: Initiate HEP (initiated 8/16/22)    Long Term Goals  Time Frame for Long term goals : 6 weeks  Long term goal 1: Indpendent in [address] [place] term goal 2: Imrpove UE strength to 5/5   Long term goal 3: Improve Spadi to less than 20% Disability   Long term goal 4: Patient to note no sleep disturbances due to Right shoulder (still having positive sleep disturbances, side sleeper).            Plan:   [x] Continue per plan of care [] Alter current plan (see comments)  [] Plan of care initiated [] Hold pending MD visit [] Discharge    Plan for Next Session:      Electronically signed by:  Israel Eller PTA

## 2022-09-08 ENCOUNTER — HOSPITAL ENCOUNTER (OUTPATIENT)
Dept: PHYSICAL THERAPY | Age: 77
Setting detail: THERAPIES SERIES
Discharge: HOME | End: 2022-09-08
Payer: MEDICARE

## 2022-09-08 PROCEDURE — 97110 THERAPEUTIC EXERCISES: CPT

## 2022-09-08 NOTE — PROGRESS NOTES
Physical Therapy Daily Treatment Note    Date:  2022    Patient Name:  Thi Speaks \"Oneil\"   :  1945  MRN: [id number]  Restrictions/Precautions:     Medical/Treatment Diagnosis Information:    Pain in right shoulder [M25.511]  Other chronic pain [G89.29]       Insurance/Certification information:   Medicare/Med [place]   Physician Information:   Kita Cunningham MD  Plan of care signed (Y/N):  y  Visit# / total visits:  9/10  Pain level: .5/10       Time In: 9:45      Time Out: 10:24    Progress Note: []  Yes  [x]  No  Next due by: Visit #10      Subjective:  Pt reports shoulder is doing well this date. Pt states has soreness in surrounding muscles. Pt reports did okay without US as well. Pt reports okay to be discharged at next session. Objective: SYLVIA performed per flow sheet for improved mobility and strengthening for improvement in daily living activities. Verbal cueing for sequencing and proper form. D/C next session.  Discussion about being able to do lat machines however do not hang from bar for pull ups     Observation:   Test measurements:      Exercises:   Exercise/Equipment Resistance/Repetitions Other comments        UBE  3' fwd, 3' retro L3   Rows/ext 25x blk    IR/ER/Abd/Flex 25x blk     Shoulder flex/abd  2x10  5#    Wall pushups  20x    Body Blade  5' Multi direction   Counter to shelf 10x 5# good eccentric control   Tricep Ext 15x 5#    Shoulder press 10x 5#          SL ER, abd 10x  5#    Prone Cuff Series 15x  5#    Scapular punches 15x 5#    ABCs 1x 5#         Cybex 6  10x    4 plate 1/2 kneel chop / standing lift   Cybex 7  10x 50# Both handles   Cybex 11 10x  50# Both handles   [] Provided verbal/tactile cueing for activities related to strengthening, flexibility, endurance, ROM. (42271)  [] Provided verbal/tactile cueing for activities related to improving balance, coordination, kinesthetic sense, posture, motor skill, proprioception. (67752)    Therapeutic Activities:     [] Therapeutic activities, direct (one-on-one) patient contact (use of dynamic activities to improve functional performance). (22985)    Gait:   [] Provided training and instruction to the patient for ambulation re-education. (59913)    Self-Care/ADL's  [] Self-care/home management training and compensatory training, meal preparation, safety procedures, and instructions in use of assistive technology devices/adaptive equipment, direct one-on-one contact. (64177)    Home Exercise Program:   Tband rows/ext, 6 way Tband , Flex/abd to 90  [x] Reviewed/Progressed HEP activities related to strengthening, flexibility, endurance, ROM. (02474)  [] Reviewed/Progressed HEP activities related to improving balance, coordination, kinesthetic sense, posture, motor skill, proprioception.  (72210)    Manual Treatments:    [] Provided manual therapy to mobilize soft tissue/joints for the purpose of modulating pain, promoting relaxation,  increasing ROM, reducing/eliminating soft tissue swelling/inflammation/restriction, improving soft tissue extensibility. (61894)    Service Based Modalities:      Timed Code Treatment Minutes:  44' therex    Total Treatment Minutes:   44'    Treatment/Activity Tolerance:  [x] Patient tolerated treatment well [] Patient limited by fatique  [] Patient limited by pain  [] Patient limited by other medical complications  [] Other:     Prognosis: [x] Good [] Fair  [] Poor    Patient Requires Follow-up: [x] Yes  [] No      Goals:  Short Term Goals  Time Frame for Short term goals: 3 weeks  Short term goal 1: Initiate HEP (initiated 8/16/22)    Long Term Goals  Time Frame for Long term goals : 6 weeks  Long term goal 1: Indpendent in HEP (compliant)  Long term goal 2: Imrpove UE strength to 5/5   Long term goal 3: Improve Spadi to less than 20% Disability   Long term goal 4: Patient to note no sleep disturbances due to Right shoulder (still having positive sleep disturbances, side sleeper). Plan:   [x] Continue per plan of care [] Alter current plan (see comments)  [] Plan of care initiated [] Hold pending MD visit [] Discharge    Plan for Next Session:      Electronically signed by:  Jo Watts PTA

## 2022-09-13 ENCOUNTER — HOSPITAL ENCOUNTER (OUTPATIENT)
Dept: PHYSICAL THERAPY | Age: 77
Setting detail: THERAPIES SERIES
Discharge: HOME | End: 2022-09-13
Payer: MEDICARE

## 2022-09-13 ENCOUNTER — IMMUNIZATION (OUTPATIENT)
Dept: LAB | Age: 77
End: 2022-09-13
Payer: MEDICARE

## 2022-09-13 PROCEDURE — G0008 ADMIN INFLUENZA VIRUS VAC: HCPCS | Performed by: FAMILY MEDICINE

## 2022-09-13 PROCEDURE — PBSHW INFLUENZA, FLUAD, (AGE 65 Y+), IM, PF, 0.5 ML: Performed by: FAMILY MEDICINE

## 2022-09-13 PROCEDURE — 90694 VACC AIIV4 NO PRSRV 0.5ML IM: CPT | Performed by: FAMILY MEDICINE

## 2022-09-13 PROCEDURE — 97110 THERAPEUTIC EXERCISES: CPT

## 2022-09-13 NOTE — PROGRESS NOTES
Physical Therapy Daily Treatment Note    Date:  2022    Patient Name:  Jocelyn Romero \"Oneil\"   :  1945  MRN: [id number]  Restrictions/Precautions:     Medical/Treatment Diagnosis Information:    Pain in right shoulder [M25.511]  Other chronic pain [G89.29]       Insurance/Certification information:   Medicare/Med [place]   Physician Information:   Marisol Vallejo MD  Plan of care signed (Y/N):  y  Visit# / total visits:  10/10  Pain level: .510       Time In: 2:29      Time Out: 3:07    Progress Note: []  Yes  [x]  No  Next due by: Visit #10      Subjective:  Pt reports shoulder is doing well and okay with being discharged this date. Pt reports 95% improvement since starting PT. Objective: SYLVIA performed per flow sheet for improved mobility and strengthening for improvement in daily living activities. Verbal cueing for sequencing and proper form. Pt confident with exercise program outside of therapy.     Observation:   Test measurements:  R shoulder flexion:  5-/5                                                           Abd:  5/5                                                              IR:  5/5                                                             ER:  5/5    SPADI Total Pain Score : 2.3 % (22 1505)  SPADI Total Disability Score  : 0 % (22 1505)  SPADI Total Score : 3 (22 1505)    Exercises:   Exercise/Equipment Resistance/Repetitions Other comments        UBE  3' fwd, 3' retro L3   Rows/ext 25x blk    IR/ER/Abd/Flex 25x blk     Shoulder flex/abd  2x10  5#    Wall pushups  20x    Body Blade  5' Multi direction   Counter to shelf 10x 5# good eccentric control   Tricep Ext 15x 5#    Shoulder press 10x 5#          SL ER, abd 10x  5#    Prone Cuff Series 15x  5#    Scapular punches 15x 5#    ABCs 1x 5#         Cybex 6  10x    4 plate 1/2 kneel chop    Cybex 7  10x 50# Both handles   Cybex 11 10x  50# Both handles   [] Provided verbal/tactile cueing for activities related to strengthening, flexibility, endurance, ROM. (42936)  [] Provided verbal/tactile cueing for activities related to improving balance, coordination, kinesthetic sense, posture, motor skill, proprioception. (32632)    Therapeutic Activities:     [] Therapeutic activities, direct (one-on-one) patient contact (use of dynamic activities to improve functional performance). (18641)    Gait:   [] Provided training and instruction to the patient for ambulation re-education. (92944)    Self-Care/ADL's  [] Self-care/home management training and compensatory training, meal preparation, safety procedures, and instructions in use of assistive technology devices/adaptive equipment, direct one-on-one contact. (92197)    Home Exercise Program:   Tband rows/ext, 6 way Tband , Flex/abd to 90  [x] Reviewed/Progressed HEP activities related to strengthening, flexibility, endurance, ROM. (66308)  [] Reviewed/Progressed HEP activities related to improving balance, coordination, kinesthetic sense, posture, motor skill, proprioception.  (88413)    Manual Treatments:    [] Provided manual therapy to mobilize soft tissue/joints for the purpose of modulating pain, promoting relaxation,  increasing ROM, reducing/eliminating soft tissue swelling/inflammation/restriction, improving soft tissue extensibility.  (16654)    Service Based Modalities:      Timed Code Treatment Minutes:  45' therex    Total Treatment Minutes:   45'    Treatment/Activity Tolerance:  [x] Patient tolerated treatment well [] Patient limited by fatique  [] Patient limited by pain  [] Patient limited by other medical complications  [] Other:     Prognosis: [x] Good [] Fair  [] Poor    Patient Requires Follow-up: [x] Yes  [] No      Goals:  Short Term Goals  Time Frame for Short term goals: 3 weeks  Short term goal 1: Initiate HEP (initiated 8/16/22)    Long Term Goals  Time Frame for Long term goals : 6 weeks  Long term goal 1: Indpendent in HEP (compliant)  Long term goal 2: Imrpove UE strength to 5/5  (see above)  Long term goal 3: Improve Spadi to less than 20% Disability (see above)  Long term goal 4: Patient to note no sleep disturbances due to Right shoulder (no longer waking pt up)          Plan:   [] Continue per plan of care [] Alter current plan (see comments)  [] Plan of care initiated [] Hold pending MD visit [x] Discharge    Plan for Next Session:      Electronically signed by:  Jasen Fuchs PTA

## 2022-11-17 ENCOUNTER — TELEPHONE (OUTPATIENT)
Dept: FAMILY MEDICINE CLINIC | Age: 77
End: 2022-11-17

## 2022-11-17 DIAGNOSIS — E78.2 MIXED HYPERLIPIDEMIA: Primary | ICD-10-CM

## 2022-11-17 NOTE — TELEPHONE ENCOUNTER
He is at the clinic today with his wife who had an appointment. He states that he has decreased his dose of Crestor to twice a week. He states that he had decreased  strength in his hands when he was taking Crestor every other day. He stopped Crestor for 2-1/2 weeks, and the  weakness resolved. He has been taking Crestor twice a week for the past few weeks, and he is tolerating this without any side effects. A lipid panel was ordered to be done next month.

## 2022-12-27 ENCOUNTER — OFFICE VISIT (OUTPATIENT)
Dept: PRIMARY CARE CLINIC | Age: 77
End: 2022-12-27
Payer: MEDICARE

## 2022-12-27 VITALS
WEIGHT: 198 LBS | BODY MASS INDEX: 28.41 KG/M2 | TEMPERATURE: 100.4 F | OXYGEN SATURATION: 97 % | DIASTOLIC BLOOD PRESSURE: 80 MMHG | SYSTOLIC BLOOD PRESSURE: 132 MMHG | HEART RATE: 74 BPM

## 2022-12-27 DIAGNOSIS — U07.1 COVID-19: Primary | ICD-10-CM

## 2022-12-27 DIAGNOSIS — J06.9 UPPER RESPIRATORY TRACT INFECTION, UNSPECIFIED TYPE: ICD-10-CM

## 2022-12-27 LAB
INFLUENZA A ANTIGEN, POC: NEGATIVE
INFLUENZA B ANTIGEN, POC: NEGATIVE
LOT EXPIRE DATE: ABNORMAL
LOT KIT NUMBER: ABNORMAL
SARS-COV-2, POC: DETECTED
VALID INTERNAL CONTROL: YES
VENDOR AND KIT NAME POC: ABNORMAL

## 2022-12-27 PROCEDURE — G8417 CALC BMI ABV UP PARAM F/U: HCPCS | Performed by: NURSE PRACTITIONER

## 2022-12-27 PROCEDURE — 3078F DIAST BP <80 MM HG: CPT | Performed by: NURSE PRACTITIONER

## 2022-12-27 PROCEDURE — 1036F TOBACCO NON-USER: CPT | Performed by: NURSE PRACTITIONER

## 2022-12-27 PROCEDURE — G8427 DOCREV CUR MEDS BY ELIG CLIN: HCPCS | Performed by: NURSE PRACTITIONER

## 2022-12-27 PROCEDURE — 87428 SARSCOV & INF VIR A&B AG IA: CPT | Performed by: NURSE PRACTITIONER

## 2022-12-27 PROCEDURE — 3074F SYST BP LT 130 MM HG: CPT | Performed by: NURSE PRACTITIONER

## 2022-12-27 PROCEDURE — 1123F ACP DISCUSS/DSCN MKR DOCD: CPT | Performed by: NURSE PRACTITIONER

## 2022-12-27 PROCEDURE — 99212 OFFICE O/P EST SF 10 MIN: CPT | Performed by: NURSE PRACTITIONER

## 2022-12-27 PROCEDURE — G8484 FLU IMMUNIZE NO ADMIN: HCPCS | Performed by: NURSE PRACTITIONER

## 2022-12-27 PROCEDURE — 99213 OFFICE O/P EST LOW 20 MIN: CPT | Performed by: NURSE PRACTITIONER

## 2022-12-27 PROCEDURE — PBSHW POCT COVID-19 & INFLUENZA A/B: Performed by: FAMILY MEDICINE

## 2022-12-27 ASSESSMENT — PATIENT HEALTH QUESTIONNAIRE - PHQ9
SUM OF ALL RESPONSES TO PHQ9 QUESTIONS 1 & 2: 0
SUM OF ALL RESPONSES TO PHQ QUESTIONS 1-9: 0
2. FEELING DOWN, DEPRESSED OR HOPELESS: 0
SUM OF ALL RESPONSES TO PHQ QUESTIONS 1-9: 0
SUM OF ALL RESPONSES TO PHQ QUESTIONS 1-9: 0
1. LITTLE INTEREST OR PLEASURE IN DOING THINGS: 0
SUM OF ALL RESPONSES TO PHQ QUESTIONS 1-9: 0

## 2022-12-27 ASSESSMENT — ENCOUNTER SYMPTOMS
CHEST TIGHTNESS: 0
SORE THROAT: 0
WHEEZING: 0
COUGH: 1
RHINORRHEA: 1
SHORTNESS OF BREATH: 0
GASTROINTESTINAL NEGATIVE: 1

## 2022-12-27 NOTE — PROGRESS NOTES
[facility]             [address], [address]                        Telephone [phone]             Fax [phone]  1945  [facility]:[id number]   Date of visit:  12/27/2022    Subjective:    Arnaud Godoy is a 69 y.o.  male who presents to [facility] today (12/27/2022) for evaluation of:    Chief Complaint   Patient presents with    URI     Chills, low grade fever        Cough  This is a new problem. The current episode started yesterday. The problem has been gradually improving. The problem occurs every few minutes. The cough is Non-productive. Associated symptoms include chills, a fever, headaches (mild) and rhinorrhea. Pertinent negatives include no chest pain, nasal congestion, postnasal drip, rash, sore throat, shortness of breath or wheezing. Nothing aggravates the symptoms. Treatments tried: ibuprofen, mucinex. The treatment provided mild relief. He has the following problem list:  Patient Active Problem List   Diagnosis    Essential hypertension    History of testicular cancer    Family history of thalassemia    Mixed hyperlipidemia    High risk medication use    Elevated fasting glucose        Current medications are:  Current Outpatient Medications   Medication Sig Dispense Refill    rosuvastatin (CRESTOR) 10 MG tablet Take 1 tablet by mouth nightly 90 tablet 2    Multiple Vitamins-Minerals (THERAPEUTIC MULTIVITAMIN-MINERALS) tablet Take 1 tablet by mouth daily      aspirin 81 MG tablet Take 81 mg by mouth daily. No current facility-administered medications for this visit. He is allergic to lipitor [atorvastatin]. .    He  reports that he has quit smoking. His smoking use included cigarettes.  He has never used smokeless tobacco.      Objective:    Vitals:    12/27/22 1140   BP: 132/80   Site: Left Upper Arm   Position: Sitting   Cuff Size: Large Adult   Pulse: 74   Temp: 100.4 °F (38 °C)   TempSrc: Tympanic   SpO2: 97%   Weight: 198 lb (89.8 kg)     Body mass index is 28.41 kg/m². Review of Systems   Constitutional:  Positive for chills, fatigue and fever. Negative for appetite change. HENT:  Positive for rhinorrhea. Negative for congestion, postnasal drip and sore throat. Respiratory:  Positive for cough. Negative for chest tightness, shortness of breath and wheezing. Cardiovascular: Negative. Negative for chest pain. Gastrointestinal: Negative. Skin:  Negative for rash. Neurological:  Positive for headaches (mild). Physical Exam  Vitals and nursing note reviewed. Constitutional:       Appearance: Normal appearance. He is well-developed. HENT:      Head: Normocephalic. Jaw: There is normal jaw occlusion. Right Ear: Tympanic membrane, ear canal and external ear normal.      Left Ear: Tympanic membrane, ear canal and external ear normal.      Nose: Rhinorrhea present. Rhinorrhea is clear. Right Turbinates: Swollen (erythema). Left Turbinates: Swollen (erythema). Right Sinus: No maxillary sinus tenderness or frontal sinus tenderness. Left Sinus: No maxillary sinus tenderness or frontal sinus tenderness. Mouth/Throat:      Lips: Pink. Mouth: Mucous membranes are moist.      Pharynx: Oropharynx is clear. Uvula midline. Posterior oropharyngeal erythema (light) present. Eyes:      Pupils: Pupils are equal, round, and reactive to light. Cardiovascular:      Rate and Rhythm: Normal rate and regular rhythm. Heart sounds: Normal heart sounds. Pulmonary:      Effort: Pulmonary effort is normal.      Breath sounds: Normal breath sounds and air entry. Musculoskeletal:      Cervical back: Normal range of motion and neck supple. Skin:     General: Skin is warm and dry. Neurological:      General: No focal deficit present.       Mental Status: He is alert and oriented to person, place, and time.   Psychiatric:         Behavior: Behavior normal.         Thought Content: Thought content normal.       Assessment and Plan:    Office Visit on 2022   Component Date Value Ref Range Status    VALID INTERNAL CONTROL 2022 yes   Final    Lot/Kit Number 2022 [id number]   Final    Lot/Kit  date: 2022   Final    SARS-COV-2, POC 2022 Detected (A)  Not Detected Final    Influenza A Antigen, POC 2022 Negative  Negative Final    Influenza B Antigen, POC 2022 Negative  Negative Final    Vendor and kit name 2022 Veritor   Final          Diagnosis Orders   1. COVID-19        2. Upper respiratory tract infection, unspecified type  POCT COVID-19 & Influenza A/B        I recommended that he use Coricidin HBP to help with congestion and cough. I also recommended Flonase for sinus symptoms. he was also encouraged to use tylenol or ibuprofen for pain/fever. Increase water intake. Use cool mist humidifier at bedtime. Use nasal saline flush as needed. Good hand hygiene. he was instructed to return if there is no improvement or symptoms worsen. The use, risks, benefits, and side effects of prescribed or recommended medications were discussed. All questions were answered and the patient/caregiver voiced understanding. No orders of the defined types were placed in this encounter.         Electronically signed by THERESA Leger CNP on 22 at 11:56 AM EST

## 2023-01-05 ENCOUNTER — HOSPITAL ENCOUNTER (OUTPATIENT)
Age: 78
Discharge: HOME | End: 2023-01-05
Payer: MEDICARE

## 2023-01-05 DIAGNOSIS — E78.2 MIXED HYPERLIPIDEMIA: ICD-10-CM

## 2023-01-05 LAB
CHOLESTEROL/HDL RATIO: 4.2
CHOLESTEROL: 181 MG/DL
HDLC SERPL-MCNC: 43 MG/DL
LDL CHOLESTEROL: 115 MG/DL (ref 0–130)
TRIGL SERPL-MCNC: 113 MG/DL

## 2023-01-05 PROCEDURE — 80061 LIPID PANEL: CPT

## 2023-01-05 PROCEDURE — 36415 COLL VENOUS BLD VENIPUNCTURE: CPT

## 2023-04-02 DIAGNOSIS — E78.2 MIXED HYPERLIPIDEMIA: ICD-10-CM

## 2023-04-04 RX ORDER — ROSUVASTATIN CALCIUM 10 MG/1
10 TABLET, COATED ORAL NIGHTLY
Qty: 90 TABLET | Refills: 1 | Status: SHIPPED | OUTPATIENT
Start: 2023-04-04

## 2023-05-17 ENCOUNTER — TELEPHONE (OUTPATIENT)
Dept: FAMILY MEDICINE CLINIC | Age: 78
End: 2023-05-17

## 2023-05-17 DIAGNOSIS — Z86.2 HISTORY OF ANEMIA: Primary | ICD-10-CM

## 2023-05-17 NOTE — TELEPHONE ENCOUNTER
He is here today with his wife for her office visit. He states that he was unable to donate blood last week because his hemoglobin screened low. He states that he was told that he was just under the limit. CBC was ordered to be done with his next labs.
Asthma    Chronic back pain  lumbar and thoracic  Hypertrophy of breast    Ovarian cyst    Sleep apnea

## 2023-05-22 ENCOUNTER — IMMUNIZATION (OUTPATIENT)
Dept: LAB | Age: 78
End: 2023-05-22
Payer: MEDICARE

## 2023-05-22 PROCEDURE — 91313 COVID-19, MODERNA BIVALENT, (AGE 12Y+), IM, 50 MCG/0.5 ML: CPT | Performed by: FAMILY MEDICINE

## 2023-06-30 ENCOUNTER — HOSPITAL ENCOUNTER (OUTPATIENT)
Age: 78
Discharge: HOME | End: 2023-06-30
Payer: MEDICARE

## 2023-06-30 DIAGNOSIS — Z86.2 HISTORY OF ANEMIA: ICD-10-CM

## 2023-06-30 DIAGNOSIS — E78.2 MIXED HYPERLIPIDEMIA: ICD-10-CM

## 2023-06-30 DIAGNOSIS — R73.01 ELEVATED FASTING GLUCOSE: ICD-10-CM

## 2023-06-30 LAB
ALBUMIN SERPL-MCNC: 4.4 G/DL (ref 3.5–5.2)
ALBUMIN/GLOB SERPL: 1.5 {RATIO} (ref 1–2.5)
ALP SERPL-CCNC: 76 U/L (ref 40–129)
ALT SERPL-CCNC: 14 U/L (ref 5–41)
ANION GAP SERPL CALCULATED.3IONS-SCNC: 9 MMOL/L (ref 9–17)
AST SERPL-CCNC: 21 U/L
BASOPHILS # BLD: 0.05 K/UL (ref 0–0.2)
BASOPHILS NFR BLD: 1 % (ref 0–2)
BILIRUB SERPL-MCNC: 0.4 MG/DL (ref 0.3–1.2)
BUN SERPL-MCNC: 18 MG/DL (ref 8–23)
BUN/CREAT SERPL: 17 (ref 9–20)
CALCIUM SERPL-MCNC: 9.8 MG/DL (ref 8.6–10.4)
CHLORIDE SERPL-SCNC: 104 MMOL/L (ref 98–107)
CHOLEST SERPL-MCNC: 168 MG/DL
CHOLESTEROL/HDL RATIO: 3.2
CO2 SERPL-SCNC: 27 MMOL/L (ref 20–31)
CREAT SERPL-MCNC: 1.05 MG/DL (ref 0.7–1.2)
EOSINOPHIL # BLD: 0.11 K/UL (ref 0–0.44)
EOSINOPHILS RELATIVE PERCENT: 2 % (ref 1–4)
ERYTHROCYTE [DISTWIDTH] IN BLOOD BY AUTOMATED COUNT: 20.2 % (ref 11.8–14.4)
EST. AVERAGE GLUCOSE BLD GHB EST-MCNC: 128 MG/DL
GFR SERPL CREATININE-BSD FRML MDRD: >60 ML/MIN/1.73M2
GLUCOSE SERPL-MCNC: 97 MG/DL (ref 70–99)
HBA1C MFR BLD: 6.1 % (ref 4–6)
HCT VFR BLD AUTO: 42.7 % (ref 40.7–50.3)
HDLC SERPL-MCNC: 52 MG/DL
HGB BLD-MCNC: 12.8 G/DL (ref 13–17)
IMM GRANULOCYTES # BLD AUTO: 0 K/UL (ref 0–0.3)
IMM GRANULOCYTES NFR BLD: 0 %
LDLC SERPL CALC-MCNC: 98 MG/DL (ref 0–130)
LYMPHOCYTES # BLD: 26 % (ref 24–43)
LYMPHOCYTES NFR BLD: 1.38 K/UL (ref 1.1–3.7)
MCH RBC QN AUTO: 20.2 PG (ref 25.2–33.5)
MCHC RBC AUTO-ENTMCNC: 30 G/DL (ref 25.2–33.5)
MCV RBC AUTO: 67.2 FL (ref 82.6–102.9)
MONOCYTES NFR BLD: 0.48 K/UL (ref 0.1–1.2)
MONOCYTES NFR BLD: 9 % (ref 3–12)
MORPHOLOGY: ABNORMAL
NEUTROPHILS NFR BLD: 62 % (ref 36–65)
NEUTS SEG NFR BLD: 3.28 K/UL (ref 1.5–8.1)
NRBC BLD-RTO: 0 PER 100 WBC
PLATELET # BLD AUTO: ABNORMAL K/UL (ref 138–453)
PLATELET, FLUORESCENCE: 220 K/UL (ref 138–453)
PLATELETS.RETICULATED NFR BLD AUTO: 2.3 % (ref 1.1–10.3)
POTASSIUM SERPL-SCNC: 5.1 MMOL/L (ref 3.7–5.3)
PROT SERPL-MCNC: 7.4 G/DL (ref 6.4–8.3)
RBC # BLD AUTO: 6.35 M/UL (ref 4.21–5.77)
SODIUM SERPL-SCNC: 140 MMOL/L (ref 135–144)
TRIGL SERPL-MCNC: 92 MG/DL
WBC OTHER # BLD: 5.3 K/UL (ref 3.5–11.3)

## 2023-06-30 PROCEDURE — 80053 COMPREHEN METABOLIC PANEL: CPT

## 2023-06-30 PROCEDURE — 36415 COLL VENOUS BLD VENIPUNCTURE: CPT

## 2023-06-30 PROCEDURE — 80061 LIPID PANEL: CPT

## 2023-06-30 PROCEDURE — 85027 COMPLETE CBC AUTOMATED: CPT

## 2023-06-30 PROCEDURE — 83036 HEMOGLOBIN GLYCOSYLATED A1C: CPT

## 2023-07-11 ENCOUNTER — OFFICE VISIT (OUTPATIENT)
Dept: FAMILY MEDICINE CLINIC | Age: 78
End: 2023-07-11
Payer: MEDICARE

## 2023-07-11 VITALS
HEART RATE: 62 BPM | WEIGHT: 190 LBS | SYSTOLIC BLOOD PRESSURE: 118 MMHG | HEIGHT: 70 IN | DIASTOLIC BLOOD PRESSURE: 78 MMHG | BODY MASS INDEX: 27.2 KG/M2

## 2023-07-11 DIAGNOSIS — Z00.00 MEDICARE ANNUAL WELLNESS VISIT, SUBSEQUENT: Primary | ICD-10-CM

## 2023-07-11 DIAGNOSIS — E78.2 MIXED HYPERLIPIDEMIA: ICD-10-CM

## 2023-07-11 DIAGNOSIS — R73.01 ELEVATED FASTING GLUCOSE: ICD-10-CM

## 2023-07-11 DIAGNOSIS — D64.9 MILD ANEMIA: ICD-10-CM

## 2023-07-11 PROCEDURE — G8427 DOCREV CUR MEDS BY ELIG CLIN: HCPCS | Performed by: FAMILY MEDICINE

## 2023-07-11 PROCEDURE — G0439 PPPS, SUBSEQ VISIT: HCPCS | Performed by: FAMILY MEDICINE

## 2023-07-11 PROCEDURE — 1123F ACP DISCUSS/DSCN MKR DOCD: CPT | Performed by: FAMILY MEDICINE

## 2023-07-11 PROCEDURE — 3078F DIAST BP <80 MM HG: CPT | Performed by: FAMILY MEDICINE

## 2023-07-11 PROCEDURE — 99214 OFFICE O/P EST MOD 30 MIN: CPT | Performed by: FAMILY MEDICINE

## 2023-07-11 PROCEDURE — 3074F SYST BP LT 130 MM HG: CPT | Performed by: FAMILY MEDICINE

## 2023-07-11 PROCEDURE — 1036F TOBACCO NON-USER: CPT | Performed by: FAMILY MEDICINE

## 2023-07-11 PROCEDURE — G8417 CALC BMI ABV UP PARAM F/U: HCPCS | Performed by: FAMILY MEDICINE

## 2023-07-11 SDOH — ECONOMIC STABILITY: FOOD INSECURITY: WITHIN THE PAST 12 MONTHS, THE FOOD YOU BOUGHT JUST DIDN'T LAST AND YOU DIDN'T HAVE MONEY TO GET MORE.: PATIENT DECLINED

## 2023-07-11 SDOH — ECONOMIC STABILITY: INCOME INSECURITY: HOW HARD IS IT FOR YOU TO PAY FOR THE VERY BASICS LIKE FOOD, HOUSING, MEDICAL CARE, AND HEATING?: PATIENT DECLINED

## 2023-07-11 SDOH — ECONOMIC STABILITY: FOOD INSECURITY: WITHIN THE PAST 12 MONTHS, YOU WORRIED THAT YOUR FOOD WOULD RUN OUT BEFORE YOU GOT MONEY TO BUY MORE.: PATIENT DECLINED

## 2023-07-11 SDOH — ECONOMIC STABILITY: HOUSING INSECURITY
IN THE LAST 12 MONTHS, WAS THERE A TIME WHEN YOU DID NOT HAVE A STEADY PLACE TO SLEEP OR SLEPT IN A SHELTER (INCLUDING NOW)?: PATIENT REFUSED

## 2023-07-11 ASSESSMENT — LIFESTYLE VARIABLES
HOW MANY STANDARD DRINKS CONTAINING ALCOHOL DO YOU HAVE ON A TYPICAL DAY: 1 OR 2
HOW OFTEN DO YOU HAVE A DRINK CONTAINING ALCOHOL: 2-4 TIMES A MONTH

## 2023-07-11 ASSESSMENT — PATIENT HEALTH QUESTIONNAIRE - PHQ9
SUM OF ALL RESPONSES TO PHQ QUESTIONS 1-9: 0
2. FEELING DOWN, DEPRESSED OR HOPELESS: 0
SUM OF ALL RESPONSES TO PHQ QUESTIONS 1-9: 0
1. LITTLE INTEREST OR PLEASURE IN DOING THINGS: 0
SUM OF ALL RESPONSES TO PHQ9 QUESTIONS 1 & 2: 0

## 2023-08-18 ENCOUNTER — HOSPITAL ENCOUNTER (OUTPATIENT)
Age: 78
Discharge: HOME | End: 2023-08-18
Payer: MEDICARE

## 2023-08-18 DIAGNOSIS — D64.9 MILD ANEMIA: ICD-10-CM

## 2023-08-18 LAB
BASOPHILS # BLD: 0.06 K/UL (ref 0–0.2)
BASOPHILS NFR BLD: 1 % (ref 0–2)
EOSINOPHIL # BLD: 0.06 K/UL (ref 0–0.44)
EOSINOPHILS RELATIVE PERCENT: 1 % (ref 1–4)
ERYTHROCYTE [DISTWIDTH] IN BLOOD BY AUTOMATED COUNT: 20.4 % (ref 11.8–14.4)
FERRITIN SERPL-MCNC: 18 NG/ML (ref 30–400)
HCT VFR BLD AUTO: 44.4 % (ref 40.7–50.3)
HGB BLD-MCNC: 13.3 G/DL (ref 13–17)
IMM GRANULOCYTES # BLD AUTO: 0 K/UL (ref 0–0.3)
IMM GRANULOCYTES NFR BLD: 0 %
IRON SATN MFR SERPL: 19 % (ref 20–55)
IRON SERPL-MCNC: 70 UG/DL (ref 59–158)
LYMPHOCYTES NFR BLD: 1.57 K/UL (ref 1.1–3.7)
LYMPHOCYTES RELATIVE PERCENT: 28 % (ref 24–43)
MCH RBC QN AUTO: 20.4 PG (ref 25.2–33.5)
MCHC RBC AUTO-ENTMCNC: 30 G/DL (ref 25.2–33.5)
MCV RBC AUTO: 68.2 FL (ref 82.6–102.9)
MONOCYTES NFR BLD: 0.67 K/UL (ref 0.1–1.2)
MONOCYTES NFR BLD: 12 % (ref 3–12)
MORPHOLOGY: ABNORMAL
NEUTROPHILS NFR BLD: 58 % (ref 36–65)
NEUTS SEG NFR BLD: 3.24 K/UL (ref 1.5–8.1)
NRBC BLD-RTO: 0 PER 100 WBC
PLATELET # BLD AUTO: 213 K/UL (ref 138–453)
PMV BLD AUTO: 9.6 FL (ref 8.1–13.5)
RBC # BLD AUTO: 6.51 M/UL (ref 4.21–5.77)
TIBC SERPL-MCNC: 373 UG/DL (ref 250–450)
UNSATURATED IRON BINDING CAPACITY: 303 UG/DL (ref 112–347)
WBC OTHER # BLD: 5.6 K/UL (ref 3.5–11.3)

## 2023-08-18 PROCEDURE — 36415 COLL VENOUS BLD VENIPUNCTURE: CPT

## 2023-08-18 PROCEDURE — 85025 COMPLETE CBC W/AUTO DIFF WBC: CPT

## 2023-08-18 PROCEDURE — 83550 IRON BINDING TEST: CPT

## 2023-08-18 PROCEDURE — 82728 ASSAY OF FERRITIN: CPT

## 2023-08-18 PROCEDURE — 83540 ASSAY OF IRON: CPT

## 2023-08-19 NOTE — DISCHARGE SUMMARY
[facility]    [x] [place]  Phone: [phone]  Fax: [phone]      [] [place]  Phone: [phone]  Fax: [phone]    Physical Therapy Discharge Note  Date: 2022        Patient Name:  Marcy Diego    :  1945  MRN: [id number]  Restrictions/Precautions:     Medical/Treatment Diagnosis Information:    Pain in right shoulder [M25.511]  Other chronic pain [G89.29]      Insurance/Certification information:   Medicare/Med [employer]   Physician Information:   Tod Cazares MD  Plan of care signed (Y/N):  y  Visit# / total visits:  10/10  Pain level:      .5/10        Time Period for Report:  22-22  Cancels/No-shows to date:  0    Plan of Care/Treatment to date:  [x] Therapeutic Exercise    [x] Modalities:  [x] Therapeutic Activity     [] Ultrasound  [] Electrical Stimulation  [] Gait Training      [] Cervical Traction    [] Lumbar Traction  [x] Neuromuscular Re-education  [] Cold/hotpack [] Iontophoresis  [x] Instruction in HEP      Other:  [x] Manual Therapy       []    [] Aquatic Therapy       []                              Subjective:        Pt reports shoulder is doing well and okay with being discharged this date. Pt reports 95% improvement since starting PT. Objective: SYLVIA performed per flow sheet for improved mobility and strengthening for improvement in daily living activities. Verbal cueing for sequencing and proper form. Pt confident with exercise program outside of therapy.      Observation:   Test measurements:  R shoulder flexion:  5-/5                                                           Abd:  5/5                                                              IR:  5/5                                                             ER:  5/5     SPADI Total Pain Score : 2.3 % (22 1505)  SPADI Total Disability Score  : 0 % (22 1505)  SPADI Total Score : 3 (22 1505)  ]       Assessment:   Patient has met all goals, independent in HEP.      Plan:    DC PT to HEP        Goals:   Short Term Goals  Time Frame for Short term goals: 3 weeks  Short term goal 1: Initiate HEP (initiated 8/16/22)     Long Term Goals  Time Frame for Long term goals : 6 weeks  Long term goal 1: Indpendent in HEP (compliant)  Long term goal 2: Imrpove UE strength to 5/5  (see above)  Long term goal 3: Improve Spadi to less than 20% Disability (see above)  Long term goal 4: Patient to note no sleep disturbances due to Right shoulder (no longer waking pt up)         Percentage of Goals Met:      5/5       Discharge Prognosis: [x] Excellent [] Good [] Fair  [] Poor     Goal Status:  [x] Achieved [] Partially Achieved  [] Not Achieved       Electronically signed by:  Luna Rodríguez, PT yes...

## 2023-08-20 ENCOUNTER — TELEPHONE (OUTPATIENT)
Dept: FAMILY MEDICINE CLINIC | Age: 78
End: 2023-08-20

## 2023-08-20 DIAGNOSIS — E61.1 IRON DEFICIENCY: Primary | ICD-10-CM

## 2023-08-20 NOTE — TELEPHONE ENCOUNTER
Please advise Kedar that the hemoglobin is improved, but the ferritin level is low. This indicates low iron stores. I recommend beginning over the counter ferrous sulfate 325 mg three times a week, and repeating labs in one month. Labs were ordered to be done in one month:  - CBC with Auto Differential; Future  - Ferritin; Future  - Iron and TIBC;  Future

## 2023-08-21 RX ORDER — FERROUS SULFATE 325(65) MG
325 TABLET ORAL
COMMUNITY

## 2023-09-20 ENCOUNTER — HOSPITAL ENCOUNTER (OUTPATIENT)
Age: 78
Discharge: HOME | End: 2023-09-20
Payer: MEDICARE

## 2023-09-20 DIAGNOSIS — E61.1 IRON DEFICIENCY: ICD-10-CM

## 2023-09-20 LAB
BASOPHILS # BLD: 0.04 K/UL (ref 0–0.2)
BASOPHILS NFR BLD: 1 % (ref 0–2)
EOSINOPHIL # BLD: 0.07 K/UL (ref 0–0.44)
EOSINOPHILS RELATIVE PERCENT: 1 % (ref 1–4)
ERYTHROCYTE [DISTWIDTH] IN BLOOD BY AUTOMATED COUNT: 20.3 % (ref 11.8–14.4)
FERRITIN SERPL-MCNC: 34 NG/ML (ref 30–400)
HCT VFR BLD AUTO: 44 % (ref 40.7–50.3)
HGB BLD-MCNC: 13.7 G/DL (ref 13–17)
IMM GRANULOCYTES # BLD AUTO: <0.03 K/UL (ref 0–0.3)
IMM GRANULOCYTES NFR BLD: 0 %
IRON SATN MFR SERPL: 60 % (ref 20–55)
IRON SERPL-MCNC: 186 UG/DL (ref 59–158)
LYMPHOCYTES NFR BLD: 1.46 K/UL (ref 1.1–3.7)
LYMPHOCYTES RELATIVE PERCENT: 24 % (ref 24–43)
MCH RBC QN AUTO: 21.5 PG (ref 25.2–33.5)
MCHC RBC AUTO-ENTMCNC: 31.1 G/DL (ref 25.2–33.5)
MCV RBC AUTO: 69.2 FL (ref 82.6–102.9)
MONOCYTES NFR BLD: 0.71 K/UL (ref 0.1–1.2)
MONOCYTES NFR BLD: 12 % (ref 3–12)
NEUTROPHILS NFR BLD: 62 % (ref 36–65)
NEUTS SEG NFR BLD: 3.75 K/UL (ref 1.5–8.1)
NRBC BLD-RTO: 0 PER 100 WBC
PLATELET # BLD AUTO: 201 K/UL (ref 138–453)
PMV BLD AUTO: 8.9 FL (ref 8.1–13.5)
RBC # BLD AUTO: 6.36 M/UL (ref 4.21–5.77)
TIBC SERPL-MCNC: 311 UG/DL (ref 250–450)
UNSATURATED IRON BINDING CAPACITY: 125 UG/DL (ref 112–347)
WBC OTHER # BLD: 6 K/UL (ref 3.5–11.3)

## 2023-09-20 PROCEDURE — 83550 IRON BINDING TEST: CPT

## 2023-09-20 PROCEDURE — 82728 ASSAY OF FERRITIN: CPT

## 2023-09-20 PROCEDURE — 83540 ASSAY OF IRON: CPT

## 2023-09-20 PROCEDURE — 85025 COMPLETE CBC W/AUTO DIFF WBC: CPT

## 2023-09-20 PROCEDURE — 36415 COLL VENOUS BLD VENIPUNCTURE: CPT

## 2023-09-24 ENCOUNTER — TELEPHONE (OUTPATIENT)
Dept: FAMILY MEDICINE CLINIC | Age: 78
End: 2023-09-24

## 2023-09-24 DIAGNOSIS — Z86.2 HISTORY OF ANEMIA: Primary | ICD-10-CM

## 2023-09-24 NOTE — TELEPHONE ENCOUNTER
Please advise Kedar that the iron studies and hemoglobin are improved. His ferritin level is still low. I recommend that he continue to take an iron supplement. Please order a CBC, ferritin, iron and TIBC to be done in 3 months. Thank you.

## 2023-10-10 ENCOUNTER — IMMUNIZATION (OUTPATIENT)
Dept: LAB | Age: 78
End: 2023-10-10
Payer: MEDICARE

## 2023-10-10 PROCEDURE — 90694 VACC AIIV4 NO PRSRV 0.5ML IM: CPT | Performed by: FAMILY MEDICINE

## 2023-10-10 PROCEDURE — PBSHW INFLUENZA, FLUAD, (AGE 65 Y+), IM, PF, 0.5 ML: Performed by: FAMILY MEDICINE

## 2023-11-21 ENCOUNTER — IMMUNIZATION (OUTPATIENT)
Dept: LAB | Age: 78
End: 2023-11-21

## 2024-01-11 DIAGNOSIS — E78.2 MIXED HYPERLIPIDEMIA: ICD-10-CM

## 2024-01-11 RX ORDER — ROSUVASTATIN CALCIUM 10 MG/1
10 TABLET, COATED ORAL NIGHTLY
Qty: 90 TABLET | Refills: 1 | Status: SHIPPED | OUTPATIENT
Start: 2024-01-11

## 2024-01-11 NOTE — TELEPHONE ENCOUNTER
Kedar called requesting a refill of the below medication which has been pended for you:     Requested Prescriptions     Pending Prescriptions Disp Refills    rosuvastatin (CRESTOR) 10 MG tablet [Pharmacy Med Name: ROSUVASTATIN CALCIUM 10 MG TAB] 90 tablet 1     Sig: take 1 tablet by mouth nightly       Last Appointment Date: 7/11/2023  Next Appointment Date: 7/16/2024    Allergies   Allergen Reactions    Lipitor [Atorvastatin] Other (See Comments)     Muscle pain

## 2024-08-19 ENCOUNTER — HOSPITAL ENCOUNTER (OUTPATIENT)
Age: 79
Discharge: HOME | End: 2024-08-19
Payer: MEDICARE

## 2024-08-19 DIAGNOSIS — Z86.2 HISTORY OF ANEMIA: ICD-10-CM

## 2024-08-19 LAB
FERRITIN SERPL-MCNC: 126 NG/ML (ref 30–400)
IRON SATN MFR SERPL: 49 % (ref 20–55)
IRON SERPL-MCNC: 137 UG/DL (ref 61–157)
TIBC SERPL-MCNC: 279 UG/DL (ref 250–450)
UNSATURATED IRON BINDING CAPACITY: 142 UG/DL (ref 112–347)

## 2024-08-19 PROCEDURE — 82728 ASSAY OF FERRITIN: CPT

## 2024-08-19 PROCEDURE — 36415 COLL VENOUS BLD VENIPUNCTURE: CPT

## 2024-08-19 PROCEDURE — 83550 IRON BINDING TEST: CPT

## 2024-08-19 PROCEDURE — 83540 ASSAY OF IRON: CPT

## 2024-08-24 ASSESSMENT — PATIENT HEALTH QUESTIONNAIRE - PHQ9
1. LITTLE INTEREST OR PLEASURE IN DOING THINGS: NOT AT ALL
SUM OF ALL RESPONSES TO PHQ QUESTIONS 1-9: 0
SUM OF ALL RESPONSES TO PHQ9 QUESTIONS 1 & 2: 0
1. LITTLE INTEREST OR PLEASURE IN DOING THINGS: NOT AT ALL
SUM OF ALL RESPONSES TO PHQ9 QUESTIONS 1 & 2: 0
SUM OF ALL RESPONSES TO PHQ QUESTIONS 1-9: 0
2. FEELING DOWN, DEPRESSED OR HOPELESS: NOT AT ALL
2. FEELING DOWN, DEPRESSED OR HOPELESS: NOT AT ALL

## 2024-08-27 ENCOUNTER — OFFICE VISIT (OUTPATIENT)
Dept: FAMILY MEDICINE CLINIC | Age: 79
End: 2024-08-27

## 2024-08-27 VITALS
SYSTOLIC BLOOD PRESSURE: 122 MMHG | WEIGHT: 193 LBS | HEART RATE: 90 BPM | BODY MASS INDEX: 27.63 KG/M2 | OXYGEN SATURATION: 95 % | HEIGHT: 70 IN | DIASTOLIC BLOOD PRESSURE: 76 MMHG

## 2024-08-27 DIAGNOSIS — Z86.2 HISTORY OF ANEMIA: ICD-10-CM

## 2024-08-27 DIAGNOSIS — Z23 NEED FOR TDAP VACCINATION: ICD-10-CM

## 2024-08-27 DIAGNOSIS — E78.2 MIXED HYPERLIPIDEMIA: ICD-10-CM

## 2024-08-27 DIAGNOSIS — R73.01 ELEVATED FASTING GLUCOSE: ICD-10-CM

## 2024-08-27 DIAGNOSIS — Z00.00 MEDICARE ANNUAL WELLNESS VISIT, SUBSEQUENT: Primary | ICD-10-CM

## 2024-08-27 SDOH — ECONOMIC STABILITY: INCOME INSECURITY: HOW HARD IS IT FOR YOU TO PAY FOR THE VERY BASICS LIKE FOOD, HOUSING, MEDICAL CARE, AND HEATING?: PATIENT DECLINED

## 2024-08-27 SDOH — ECONOMIC STABILITY: FOOD INSECURITY: WITHIN THE PAST 12 MONTHS, THE FOOD YOU BOUGHT JUST DIDN'T LAST AND YOU DIDN'T HAVE MONEY TO GET MORE.: PATIENT DECLINED

## 2024-08-27 SDOH — ECONOMIC STABILITY: FOOD INSECURITY: WITHIN THE PAST 12 MONTHS, YOU WORRIED THAT YOUR FOOD WOULD RUN OUT BEFORE YOU GOT MONEY TO BUY MORE.: PATIENT DECLINED

## 2024-08-27 ASSESSMENT — PATIENT HEALTH QUESTIONNAIRE - PHQ9
2. FEELING DOWN, DEPRESSED OR HOPELESS: NOT AT ALL
SUM OF ALL RESPONSES TO PHQ9 QUESTIONS 1 & 2: 0
1. LITTLE INTEREST OR PLEASURE IN DOING THINGS: NOT AT ALL
SUM OF ALL RESPONSES TO PHQ QUESTIONS 1-9: 0

## 2024-08-27 ASSESSMENT — LIFESTYLE VARIABLES
HOW OFTEN DO YOU HAVE A DRINK CONTAINING ALCOHOL: 2-4 TIMES A MONTH
HOW MANY STANDARD DRINKS CONTAINING ALCOHOL DO YOU HAVE ON A TYPICAL DAY: 1 OR 2

## 2024-08-27 NOTE — PROGRESS NOTES
Medicare Annual Wellness Visit    Kedar Carbajal is here for Medicare AWV, Anemia (Annual exam ), Hyperlipidemia (Annual exam ), and elevated fasting glucose (Annual exam)    Assessment & Plan   History of anemia  -     CBC with Auto Differential; Future  Mixed hyperlipidemia  -     Lipid Panel; Future  Elevated fasting glucose  -     Comprehensive Metabolic Panel; Future  -     Hemoglobin A1C; Future    Recommendations for Preventive Services Due: see orders and patient instructions/AVS.  Recommended screening schedule for the next 5-10 years is provided to the patient in written form: see Patient Instructions/AVS.     No follow-ups on file.     Subjective       Patient's complete Health Risk Assessment and screening values have been reviewed and are found in Flowsheets. The following problems were reviewed today and where indicated follow up appointments were made and/or referrals ordered.    Positive Risk Factor Screenings with Interventions:               General HRA Questions:  Select all that apply: (!) Stress    Interventions - Stress:  Patient declined any further interventions or treatment                            Objective   Vitals:    08/27/24 1126   BP: 122/76   Site: Right Upper Arm   Position: Sitting   Cuff Size: Large Adult   Pulse: 90   SpO2: 95%   Weight: 87.5 kg (193 lb)   Height: 1.778 m (5' 10\")      Body mass index is 27.69 kg/m².                    Allergies   Allergen Reactions    Lipitor [Atorvastatin] Other (See Comments)     Muscle pain     Prior to Visit Medications    Medication Sig Taking? Authorizing Provider   rosuvastatin (CRESTOR) 10 MG tablet take 1 tablet by mouth nightly Yes Destiney Flood MD   ferrous sulfate (IRON 325) 325 (65 Fe) MG tablet Take 1 tablet by mouth Twice a Week Yes Destiney Flood MD   Multiple Vitamins-Minerals (THERAPEUTIC MULTIVITAMIN-MINERALS) tablet Take 1 tablet by mouth daily Yes ProviderIsamar MD   aspirin 81 MG tablet Take 1 tablet by mouth

## 2024-08-27 NOTE — PATIENT INSTRUCTIONS
Learning About Stress  What is stress?     Stress is your body's response to a hard situation. Your body can have a physical, emotional, or mental response. Stress is a fact of life for most people, and it affects everyone differently. What causes stress for you may not be stressful for someone else.  A lot of things can cause stress. You may feel stress when you go on a job interview, take a test, or run a race. This kind of short-term stress is normal and even useful. It can help you if you need to work hard or react quickly. For example, stress can help you finish an important job on time.  Long-term stress is caused by ongoing stressful situations or events. Examples of long-term stress include long-term health problems, ongoing problems at work, or conflicts in your family. Long-term stress can harm your health.  How does stress affect your health?  When you are stressed, your body responds as though you are in danger. It makes hormones that speed up your heart, make you breathe faster, and give you a burst of energy. This is called the fight-or-flight stress response. If the stress is over quickly, your body goes back to normal and no harm is done.  But if stress happens too often or lasts too long, it can have bad effects. Long-term stress can make you more likely to get sick, and it can make symptoms of some diseases worse. If you tense up when you are stressed, you may develop neck, shoulder, or low back pain. Stress is linked to high blood pressure and heart disease.  Stress also harms your emotional health. It can make you epps, tense, or depressed. Your relationships may suffer, and you may not do well at work or school.  What can you do to manage stress?  You can try these things to help manage stress:   Do something active. Exercise or activity can help reduce stress. Walking is a great way to get started. Even everyday activities such as housecleaning or yard work can help.  Try yoga or roberth chi.  saturated and trans fats. They increase your risk of heart disease by raising cholesterol levels.     Limit the amount of solid fat--butter, margarine, and shortening--you eat. Use olive, peanut, or canola oil when you cook. Bake, broil, and steam foods instead of frying them.     Eat a variety of fruit and vegetables every day. Dark green, deep orange, red, or yellow fruits and vegetables are especially good for you. Examples include spinach, carrots, peaches, and berries.     Foods high in fiber can reduce your cholesterol and provide important vitamins and minerals. High-fiber foods include whole-grain cereals and breads, oatmeal, beans, brown rice, citrus fruits, and apples.     Eat lean proteins. Heart-healthy proteins include seafood, lean meats and poultry, eggs, beans, peas, nuts, seeds, and soy products.     Limit drinks and foods with added sugar. These include candy, desserts, and soda pop.   Heart-healthy lifestyle    If your doctor recommends it, get more exercise. For many people, walking is a good choice. Or you may want to swim, bike, or do other activities. Bit by bit, increase the time you're active every day. Try for at least 30 minutes on most days of the week.     Try to quit or cut back on using tobacco and other nicotine products. This includes smoking and vaping. If you need help quitting, talk to your doctor about stop-smoking programs and medicines. These can increase your chances of quitting for good. Quitting is one of the most important things you can do to protect your heart. It is never too late to quit. Try to avoid secondhand smoke too.     Stay at a weight that's healthy for you. Talk to your doctor if you need help losing weight.     Try to get 7 to 9 hours of sleep each night.     Limit alcohol to 2 drinks a day for men and 1 drink a day for women. Too much alcohol can cause health problems.     Manage other health problems such as diabetes, high blood pressure, and high

## 2024-08-27 NOTE — PROGRESS NOTES
[facility]             [address] [address]                        Telephone [phone]             Fax [phone]       Kedar Carbajal  :  1945  Age:  78 y.o.   MRN:  [id number]  Date of visit:  2024       Assessment and Plan:    1. Medicare annual wellness visit, subsequent  See separate progress note for Medicare Wellness Visit.     2. Mixed hyperlipidemia  His previous order for a lipid panel has .    - Lipid Panel; Future was ordered to be done when he returns fasting.  He will be contacted when the results are available.     3. Elevated fasting glucose  His previous order for a comprehensive panel and HgbA1c have .    Labs were ordered to be done when he returns fasting:  - Comprehensive Metabolic Panel; Future  - Hemoglobin A1C; Future    He will be contacted when the results are available.     4. History of anemia  He has not donated blood recently.  - CBC with Auto Differential; Future was ordered to be done when he returns for labs.  He will be contacted when the results are available.     His recent iron studies are in the normal range.    5. Need for Tdap vaccination  Tdap was recommended and a printed prescription was given to the patient.   - Tdap (ADACEL) 5-2-15.5 LF-MCG/0.5 injection; Inject 0.5 mLs into the muscle once for 1 dose  Dispense: 0.5 mL; Refill: 0    6.  Routine health maintenance  Health maintenance was reviewed with the patient.   He was advised to get an updated Covid vaccination this fall.  Annual influenza vaccine was recommended.   Tdap was recommended and a printed prescription was given to the patient.      Follow up instructions were given to the patient:  Return in about 1 year (around 2025) for Medicare Wellness Visit.           Subjective:    Kedar Carbajal is a 78 y.o. male who presents to [facility] today (2024) for follow

## 2024-08-28 ENCOUNTER — HOSPITAL ENCOUNTER (OUTPATIENT)
Age: 79
Discharge: HOME | End: 2024-08-28
Payer: MEDICARE

## 2024-08-28 DIAGNOSIS — R73.01 ELEVATED FASTING GLUCOSE: ICD-10-CM

## 2024-08-28 DIAGNOSIS — Z86.2 HISTORY OF ANEMIA: ICD-10-CM

## 2024-08-28 DIAGNOSIS — E78.2 MIXED HYPERLIPIDEMIA: ICD-10-CM

## 2024-08-28 LAB
ALBUMIN SERPL-MCNC: 4 G/DL (ref 3.5–5.2)
ALBUMIN/GLOB SERPL: 1.3 {RATIO} (ref 1–2.5)
ALP SERPL-CCNC: 68 U/L (ref 40–129)
ALT SERPL-CCNC: 14 U/L (ref 5–41)
ANION GAP SERPL CALCULATED.3IONS-SCNC: 8 MMOL/L (ref 9–17)
AST SERPL-CCNC: 21 U/L
BASOPHILS # BLD: 0.04 K/UL (ref 0–0.2)
BASOPHILS NFR BLD: 1 % (ref 0–2)
BILIRUB SERPL-MCNC: 0.5 MG/DL (ref 0.3–1.2)
BUN SERPL-MCNC: 22 MG/DL (ref 8–23)
BUN/CREAT SERPL: 20 (ref 9–20)
CALCIUM SERPL-MCNC: 9.6 MG/DL (ref 8.6–10.4)
CHLORIDE SERPL-SCNC: 103 MMOL/L (ref 98–107)
CHOLEST SERPL-MCNC: 180 MG/DL (ref 0–199)
CHOLESTEROL/HDL RATIO: 4
CO2 SERPL-SCNC: 26 MMOL/L (ref 20–31)
CREAT SERPL-MCNC: 1.1 MG/DL (ref 0.7–1.2)
EOSINOPHIL # BLD: 0.06 K/UL (ref 0–0.44)
EOSINOPHILS RELATIVE PERCENT: 1 % (ref 1–4)
ERYTHROCYTE [DISTWIDTH] IN BLOOD BY AUTOMATED COUNT: 16.5 % (ref 11.8–14.4)
EST. AVERAGE GLUCOSE BLD GHB EST-MCNC: 134 MG/DL
GFR, ESTIMATED: 69 ML/MIN/1.73M2
GLUCOSE SERPL-MCNC: 111 MG/DL (ref 70–99)
HBA1C MFR BLD: 6.3 % (ref 4–6)
HCT VFR BLD AUTO: 44.6 % (ref 40.7–50.3)
HDLC SERPL-MCNC: 48 MG/DL
HGB BLD-MCNC: 14 G/DL (ref 13–17)
IMM GRANULOCYTES # BLD AUTO: <0.03 K/UL (ref 0–0.3)
IMM GRANULOCYTES NFR BLD: 0 %
LDLC SERPL CALC-MCNC: 110 MG/DL (ref 0–100)
LYMPHOCYTES NFR BLD: 1.22 K/UL (ref 1.1–3.7)
LYMPHOCYTES RELATIVE PERCENT: 22 % (ref 24–43)
MCH RBC QN AUTO: 22.3 PG (ref 25.2–33.5)
MCHC RBC AUTO-ENTMCNC: 31.4 G/DL (ref 25.2–33.5)
MCV RBC AUTO: 71 FL (ref 82.6–102.9)
MONOCYTES NFR BLD: 0.62 K/UL (ref 0.1–1.2)
MONOCYTES NFR BLD: 11 % (ref 3–12)
NEUTROPHILS NFR BLD: 65 % (ref 36–65)
NEUTS SEG NFR BLD: 3.53 K/UL (ref 1.5–8.1)
NRBC BLD-RTO: 0 PER 100 WBC
PLATELET # BLD AUTO: 220 K/UL (ref 138–453)
PMV BLD AUTO: 9.4 FL (ref 8.1–13.5)
POTASSIUM SERPL-SCNC: 4.8 MMOL/L (ref 3.7–5.3)
PROT SERPL-MCNC: 7.2 G/DL (ref 6.4–8.3)
RBC # BLD AUTO: 6.28 M/UL (ref 4.21–5.77)
RBC # BLD: ABNORMAL 10*6/UL
SODIUM SERPL-SCNC: 137 MMOL/L (ref 135–144)
TRIGL SERPL-MCNC: 107 MG/DL
VLDLC SERPL CALC-MCNC: 21 MG/DL
WBC OTHER # BLD: 5.5 K/UL (ref 3.5–11.3)

## 2024-08-28 PROCEDURE — 36415 COLL VENOUS BLD VENIPUNCTURE: CPT

## 2024-08-28 PROCEDURE — 85025 COMPLETE CBC W/AUTO DIFF WBC: CPT

## 2024-08-28 PROCEDURE — 80053 COMPREHEN METABOLIC PANEL: CPT

## 2024-08-28 PROCEDURE — 80061 LIPID PANEL: CPT

## 2024-08-28 PROCEDURE — 83036 HEMOGLOBIN GLYCOSYLATED A1C: CPT

## 2024-08-29 DIAGNOSIS — R73.01 ELEVATED FASTING GLUCOSE: ICD-10-CM

## 2024-08-29 DIAGNOSIS — E78.2 MIXED HYPERLIPIDEMIA: Primary | ICD-10-CM

## 2024-08-29 DIAGNOSIS — Z86.2 HISTORY OF ANEMIA: ICD-10-CM

## 2024-10-04 NOTE — PATIENT INSTRUCTIONS
Problem: Discharge Planning  Goal: Discharge to home or other facility with appropriate resources  Outcome: Progressing  Flowsheets (Taken 10/4/2024 0815)  Discharge to home or other facility with appropriate resources: Identify barriers to discharge with patient and caregiver     Problem: Skin/Tissue Integrity  Goal: Absence of new skin breakdown  Description: 1.  Monitor for areas of redness and/or skin breakdown  2.  Assess vascular access sites hourly  3.  Every 4-6 hours minimum:  Change oxygen saturation probe site  4.  Every 4-6 hours:  If on nasal continuous positive airway pressure, respiratory therapy assess nares and determine need for appliance change or resting period.  Outcome: Progressing      Chest  X ray  Labs today  RTc 1 year

## 2024-10-28 ENCOUNTER — IMMUNIZATION (OUTPATIENT)
Dept: LAB | Age: 79
End: 2024-10-28
Payer: MEDICARE

## 2024-10-28 PROCEDURE — G0008 ADMIN INFLUENZA VIRUS VAC: HCPCS | Performed by: FAMILY MEDICINE

## 2024-10-28 PROCEDURE — PBSHW COVID-19, COMIRNATY, (AGE 12Y+), IM, PF, 30MCG/0.3ML: Performed by: FAMILY MEDICINE

## 2024-10-28 PROCEDURE — 91320 SARSCV2 VAC 30MCG TRS-SUC IM: CPT | Performed by: FAMILY MEDICINE

## 2024-10-28 PROCEDURE — PBSHW INFLUENZA, FLUAD TRIVALENT, (AGE 65 Y+), IM, PRESERVATIVE FREE, 0.5ML: Performed by: FAMILY MEDICINE

## 2025-01-24 ENCOUNTER — OFFICE VISIT (OUTPATIENT)
Dept: PRIMARY CARE CLINIC | Age: 80
End: 2025-01-24
Payer: MEDICARE

## 2025-01-24 VITALS
WEIGHT: 198 LBS | HEART RATE: 74 BPM | DIASTOLIC BLOOD PRESSURE: 80 MMHG | SYSTOLIC BLOOD PRESSURE: 120 MMHG | OXYGEN SATURATION: 98 % | TEMPERATURE: 98 F | BODY MASS INDEX: 28.41 KG/M2

## 2025-01-24 DIAGNOSIS — M72.2 PLANTAR FASCIITIS OF LEFT FOOT: Primary | ICD-10-CM

## 2025-01-24 PROCEDURE — 99212 OFFICE O/P EST SF 10 MIN: CPT | Performed by: NURSE PRACTITIONER

## 2025-01-24 RX ORDER — PREDNISONE 20 MG/1
40 TABLET ORAL DAILY
Qty: 10 TABLET | Refills: 0 | Status: SHIPPED | OUTPATIENT
Start: 2025-01-24 | End: 2025-01-29

## 2025-01-24 ASSESSMENT — ENCOUNTER SYMPTOMS
WHEEZING: 0
DIARRHEA: 0
NAUSEA: 0
CHEST TIGHTNESS: 0
ABDOMINAL PAIN: 0
COUGH: 0
COLOR CHANGE: 0
VOMITING: 0
CONSTIPATION: 0
SHORTNESS OF BREATH: 0
ABDOMINAL DISTENTION: 0

## 2025-01-24 ASSESSMENT — PATIENT HEALTH QUESTIONNAIRE - PHQ9
2. FEELING DOWN, DEPRESSED OR HOPELESS: NOT AT ALL
SUM OF ALL RESPONSES TO PHQ QUESTIONS 1-9: 0
1. LITTLE INTEREST OR PLEASURE IN DOING THINGS: NOT AT ALL
SUM OF ALL RESPONSES TO PHQ9 QUESTIONS 1 & 2: 0
SUM OF ALL RESPONSES TO PHQ QUESTIONS 1-9: 0

## 2025-01-24 NOTE — PROGRESS NOTES
[facility], [facility], [place], [facility]  [address]  [facility] [address]  Dept: [phone]  Dept Fax: [phone]    Kedar Carbajal is a 79 y.o. male who presents today for his medical conditions/complaintsas noted below.  Kedar Carbajal is c/o of   Chief Complaint   Patient presents with    Foot Pain     Left foot, no injury, wonders if it is gout      HPI:     History of Present Illness  The patient is a 79-year-old male who presents to the walk-in clinic for foot pain.    He reports experiencing severe foot pain upon waking, which hinders his ability to stand or walk initially. The pain, described as a 10 on a scale of 1 to 10, gradually subsides to a level of 3 after some movement and stretching. This issue has been persistent since Tuesday. He also notes a recent change in his diet, which included the consumption of oysters during his stay in [place] and lobster upon his return home. The pain is predominantly localized at the plantar aspect of his foot. He denies any injury or trauma to the foot.     Hemoglobin A1C (%)   Date Value   08/28/2024 6.3 (H)   06/30/2023 6.1 (H)   06/27/2022 6.1 (H)             ( goal A1Cis < 7)   No components found for: \"LABMICR\"  No components found for: \"LDLCHOLESTEROL\", \"LDLCALC\"    (goal LDL is <100)   AST (U/L)   Date Value   08/28/2024 21     ALT (U/L)   Date Value   08/28/2024 14     BUN (mg/dL)   Date Value   08/28/2024 22     BP Readings from Last 3 Encounters:   01/24/25 120/80   08/27/24 122/76   07/11/23 118/78          (goal 120/80)    Past Medical History:   Diagnosis Date    Hyperlipidemia     Hypertension     Testicular cancer (HCC)     Status post, left orchiectomy. No evidence of disease at this time.      Past Surgical History:   Procedure Laterality Date    COLONOSCOPY  07/08/2003    normal, repeat 10 yrs    COLONOSCOPY  04/08/2014    2 polyps, repeat 5

## 2025-05-27 ENCOUNTER — OFFICE VISIT (OUTPATIENT)
Dept: PRIMARY CARE CLINIC | Age: 80
End: 2025-05-27
Payer: MEDICARE

## 2025-05-27 VITALS
DIASTOLIC BLOOD PRESSURE: 80 MMHG | TEMPERATURE: 98.7 F | BODY MASS INDEX: 28.35 KG/M2 | SYSTOLIC BLOOD PRESSURE: 110 MMHG | HEIGHT: 70 IN | HEART RATE: 80 BPM | WEIGHT: 198 LBS | OXYGEN SATURATION: 98 %

## 2025-05-27 DIAGNOSIS — J06.9 VIRAL URI WITH COUGH: Primary | ICD-10-CM

## 2025-05-27 PROCEDURE — 99213 OFFICE O/P EST LOW 20 MIN: CPT

## 2025-05-27 RX ORDER — ELECTROLYTES/DEXTROSE
PACKET (EA) ORAL
COMMUNITY

## 2025-05-27 RX ORDER — BENZONATATE 100 MG/1
100 CAPSULE ORAL 3 TIMES DAILY PRN
Qty: 20 CAPSULE | Refills: 0 | Status: SHIPPED | OUTPATIENT
Start: 2025-05-27 | End: 2025-06-03

## 2025-05-27 ASSESSMENT — ENCOUNTER SYMPTOMS
CONSTIPATION: 0
DIARRHEA: 0
SINUS PRESSURE: 1
RHINORRHEA: 1
SHORTNESS OF BREATH: 0
COUGH: 1
COLOR CHANGE: 0
VOMITING: 0
ABDOMINAL PAIN: 0
SINUS COMPLAINT: 1
SORE THROAT: 1

## 2025-05-27 NOTE — PROGRESS NOTES
[facility]  [address]  [facility] [address]  Dept: [phone]  Dept Fax: [phone]    Kedar Carbajal is a 79 y.o. male who presents today for his medical conditions/complaints as noted below. Kedar Carbajal is c/o of   Chief Complaint   Patient presents with    Sinus Problem     Cough,chest congestion,sinus pain and pressure. Post nasal drip. Sx began 3 days ago.    .    HPI:     No sick contacts that he is aware of. He is eating and drinking well. Denies any fevers, nausea/vomiting, or diarrhea.    Sinus Problem  This is a new problem. Episode onset: 3 days ago. The problem is unchanged. There has been no fever. Associated symptoms include chills, congestion, coughing, sinus pressure and a sore throat. Pertinent negatives include no diaphoresis, ear pain or shortness of breath. Treatments tried: Flonase and Tylenol. The treatment provided moderate relief.     Past Medical History:   Diagnosis Date    Hyperlipidemia     Hypertension     Testicular cancer (HCC)     Status post, left orchiectomy. No evidence of disease at this time.     Past Surgical History:   Procedure Laterality Date    COLONOSCOPY  07/08/2003    normal, repeat 10 yrs    COLONOSCOPY  04/08/2014    2 polyps, repeat 5 years, Dr. Morales    HEMORRHOID SURGERY  1982    TESTICLE REMOVAL Left 01/2007    testicular cancer       Family History   Adopted: Yes   Problem Relation Age of Onset    Diabetes Mother     Heart Attack Mother         Myocardial infarction.    Stroke Mother     Diabetes Father     Heart Failure Father         Congestive heart failure.    Other Brother         Thalassemia major.    Cancer Sister         Pancreatic.    Cancer Paternal Grandfather         Unknown type.       Social History     Tobacco Use    Smoking status: Former     Types: Cigarettes    Smokeless tobacco: Never    Tobacco

## 2025-05-27 NOTE — PATIENT INSTRUCTIONS
Signs and symptoms of upper respiratory infection / viral illness.   Antibiotics are not indicated at the present time.  May be treated with over the counter medications. (Sudafed, cold/ sinus, or Mucinex)  Daily Claritin or Zyrtec with Flonase  Benzonatate as needed for cough  If high blood pressure may use Corcidin OTC cold medications  Patient can use Tylenol and/or OTC cough syrup.   Advised to follow up with family doctor or return to urgent care if does not get better or symptoms worsen.  Pt can go to ER if high fever >102 , vomiting, breathing difficulty, lethargy.

## 2025-07-07 DIAGNOSIS — E78.2 MIXED HYPERLIPIDEMIA: ICD-10-CM

## 2025-07-08 RX ORDER — ROSUVASTATIN CALCIUM 10 MG/1
10 TABLET, COATED ORAL NIGHTLY
Qty: 30 TABLET | Refills: 0 | Status: SHIPPED | OUTPATIENT
Start: 2025-07-08

## 2025-08-07 ENCOUNTER — HOSPITAL ENCOUNTER (OUTPATIENT)
Dept: LAB | Age: 80
Discharge: HOME | End: 2025-08-07
Payer: MEDICARE

## 2025-08-07 DIAGNOSIS — R73.01 ELEVATED FASTING GLUCOSE: ICD-10-CM

## 2025-08-07 DIAGNOSIS — E78.2 MIXED HYPERLIPIDEMIA: ICD-10-CM

## 2025-08-07 DIAGNOSIS — Z86.2 HISTORY OF ANEMIA: ICD-10-CM

## 2025-08-07 LAB
ALBUMIN SERPL-MCNC: 4.3 G/DL (ref 3.5–5.2)
ALBUMIN/GLOB SERPL: 1.5 {RATIO} (ref 1–2.5)
ALP SERPL-CCNC: 79 U/L (ref 40–129)
ALT SERPL-CCNC: 10 U/L (ref 10–50)
ANION GAP SERPL CALCULATED.3IONS-SCNC: 10 MMOL/L (ref 9–16)
AST SERPL-CCNC: 23 U/L (ref 10–50)
BASOPHILS # BLD: 0.03 K/UL (ref 0–0.2)
BASOPHILS NFR BLD: 1 % (ref 0–2)
BILIRUB SERPL-MCNC: 0.6 MG/DL (ref 0–1.2)
BUN SERPL-MCNC: 19 MG/DL (ref 8–23)
BUN/CREAT SERPL: 17 (ref 9–20)
CALCIUM SERPL-MCNC: 9.4 MG/DL (ref 8.6–10.4)
CHLORIDE SERPL-SCNC: 104 MMOL/L (ref 98–107)
CHOLEST SERPL-MCNC: 195 MG/DL (ref 0–199)
CHOLESTEROL/HDL RATIO: 3.8
CO2 SERPL-SCNC: 25 MMOL/L (ref 20–31)
CREAT SERPL-MCNC: 1.1 MG/DL (ref 0.7–1.2)
EOSINOPHIL # BLD: 0.07 K/UL (ref 0–0.44)
EOSINOPHILS RELATIVE PERCENT: 2 % (ref 1–4)
ERYTHROCYTE [DISTWIDTH] IN BLOOD BY AUTOMATED COUNT: 17.8 % (ref 11.8–14.4)
EST. AVERAGE GLUCOSE BLD GHB EST-MCNC: 126 MG/DL
GFR, ESTIMATED: 69 ML/MIN/1.73M2
GLUCOSE SERPL-MCNC: 112 MG/DL (ref 74–99)
HBA1C MFR BLD: 6 % (ref 4–6)
HCT VFR BLD AUTO: 46 % (ref 40.7–50.3)
HDLC SERPL-MCNC: 52 MG/DL
HGB BLD-MCNC: 14 G/DL (ref 13–17)
IMM GRANULOCYTES # BLD AUTO: <0.03 K/UL (ref 0–0.3)
IMM GRANULOCYTES NFR BLD: 0 %
LDLC SERPL CALC-MCNC: 125 MG/DL (ref 0–100)
LYMPHOCYTES NFR BLD: 1.11 K/UL (ref 1.1–3.7)
LYMPHOCYTES RELATIVE PERCENT: 24 % (ref 24–43)
MCH RBC QN AUTO: 22.2 PG (ref 25.2–33.5)
MCHC RBC AUTO-ENTMCNC: 30.4 G/DL (ref 25.2–33.5)
MCV RBC AUTO: 73 FL (ref 82.6–102.9)
MONOCYTES NFR BLD: 0.55 K/UL (ref 0.1–1.2)
MONOCYTES NFR BLD: 12 % (ref 3–12)
NEUTROPHILS NFR BLD: 61 % (ref 36–65)
NEUTS SEG NFR BLD: 2.91 K/UL (ref 1.5–8.1)
NRBC BLD-RTO: 0 PER 100 WBC
PLATELET # BLD AUTO: 205 K/UL (ref 138–453)
PMV BLD AUTO: 9.1 FL (ref 8.1–13.5)
POTASSIUM SERPL-SCNC: 5 MMOL/L (ref 3.7–5.3)
PROT SERPL-MCNC: 7.2 G/DL (ref 6.6–8.7)
RBC # BLD AUTO: 6.3 M/UL (ref 4.21–5.77)
RBC # BLD: ABNORMAL 10*6/UL
SODIUM SERPL-SCNC: 139 MMOL/L (ref 136–145)
TRIGL SERPL-MCNC: 92 MG/DL
VLDLC SERPL CALC-MCNC: 18 MG/DL (ref 1–30)
WBC OTHER # BLD: 4.7 K/UL (ref 3.5–11.3)

## 2025-08-07 PROCEDURE — 83036 HEMOGLOBIN GLYCOSYLATED A1C: CPT

## 2025-08-07 PROCEDURE — 36415 COLL VENOUS BLD VENIPUNCTURE: CPT

## 2025-08-07 PROCEDURE — 80053 COMPREHEN METABOLIC PANEL: CPT

## 2025-08-07 PROCEDURE — 85025 COMPLETE CBC W/AUTO DIFF WBC: CPT

## 2025-08-07 PROCEDURE — 80061 LIPID PANEL: CPT

## 2025-08-27 ENCOUNTER — OFFICE VISIT (OUTPATIENT)
Dept: PRIMARY CARE CLINIC | Age: 80
End: 2025-08-27
Payer: MEDICARE

## 2025-08-27 ENCOUNTER — HOSPITAL ENCOUNTER (OUTPATIENT)
Dept: LAB | Age: 80
Discharge: HOME | End: 2025-08-27
Payer: MEDICARE

## 2025-08-27 VITALS
HEIGHT: 70 IN | TEMPERATURE: 96.7 F | WEIGHT: 196.6 LBS | BODY MASS INDEX: 28.15 KG/M2 | HEART RATE: 71 BPM | DIASTOLIC BLOOD PRESSURE: 74 MMHG | SYSTOLIC BLOOD PRESSURE: 134 MMHG | RESPIRATION RATE: 24 BRPM | OXYGEN SATURATION: 94 %

## 2025-08-27 DIAGNOSIS — M79.675 PAIN IN TOE OF LEFT FOOT: ICD-10-CM

## 2025-08-27 DIAGNOSIS — M79.675 PAIN IN TOE OF LEFT FOOT: Primary | ICD-10-CM

## 2025-08-27 LAB — URATE SERPL-MCNC: 6.1 MG/DL (ref 3.4–7)

## 2025-08-27 PROCEDURE — 99212 OFFICE O/P EST SF 10 MIN: CPT

## 2025-08-27 PROCEDURE — 84550 ASSAY OF BLOOD/URIC ACID: CPT

## 2025-08-27 PROCEDURE — 36415 COLL VENOUS BLD VENIPUNCTURE: CPT

## 2025-08-27 RX ORDER — PREDNISONE 20 MG/1
20 TABLET ORAL 2 TIMES DAILY
Qty: 10 TABLET | Refills: 0 | Status: SHIPPED | OUTPATIENT
Start: 2025-08-27 | End: 2025-09-01

## 2025-08-27 SDOH — ECONOMIC STABILITY: FOOD INSECURITY: WITHIN THE PAST 12 MONTHS, YOU WORRIED THAT YOUR FOOD WOULD RUN OUT BEFORE YOU GOT MONEY TO BUY MORE.: NEVER TRUE

## 2025-08-27 SDOH — ECONOMIC STABILITY: FOOD INSECURITY: WITHIN THE PAST 12 MONTHS, THE FOOD YOU BOUGHT JUST DIDN'T LAST AND YOU DIDN'T HAVE MONEY TO GET MORE.: NEVER TRUE

## 2025-08-27 ASSESSMENT — ENCOUNTER SYMPTOMS
DIARRHEA: 0
VOMITING: 0
COLOR CHANGE: 0
NAUSEA: 0
ABDOMINAL PAIN: 0
SHORTNESS OF BREATH: 0
COUGH: 0

## 2025-09-01 SDOH — HEALTH STABILITY: PHYSICAL HEALTH: ON AVERAGE, HOW MANY DAYS PER WEEK DO YOU ENGAGE IN MODERATE TO STRENUOUS EXERCISE (LIKE A BRISK WALK)?: 4 DAYS

## 2025-09-01 SDOH — ECONOMIC STABILITY: FOOD INSECURITY: WITHIN THE PAST 12 MONTHS, THE FOOD YOU BOUGHT JUST DIDN'T LAST AND YOU DIDN'T HAVE MONEY TO GET MORE.: NEVER TRUE

## 2025-09-01 SDOH — ECONOMIC STABILITY: INCOME INSECURITY: IN THE LAST 12 MONTHS, WAS THERE A TIME WHEN YOU WERE NOT ABLE TO PAY THE MORTGAGE OR RENT ON TIME?: NO

## 2025-09-01 SDOH — ECONOMIC STABILITY: FOOD INSECURITY: WITHIN THE PAST 12 MONTHS, YOU WORRIED THAT YOUR FOOD WOULD RUN OUT BEFORE YOU GOT MONEY TO BUY MORE.: NEVER TRUE

## 2025-09-01 SDOH — HEALTH STABILITY: PHYSICAL HEALTH: ON AVERAGE, HOW MANY MINUTES DO YOU ENGAGE IN EXERCISE AT THIS LEVEL?: 60 MIN

## 2025-09-01 SDOH — ECONOMIC STABILITY: TRANSPORTATION INSECURITY
IN THE PAST 12 MONTHS, HAS LACK OF TRANSPORTATION KEPT YOU FROM MEETINGS, WORK, OR FROM GETTING THINGS NEEDED FOR DAILY LIVING?: NO

## 2025-09-01 SDOH — ECONOMIC STABILITY: TRANSPORTATION INSECURITY
IN THE PAST 12 MONTHS, HAS THE LACK OF TRANSPORTATION KEPT YOU FROM MEDICAL APPOINTMENTS OR FROM GETTING MEDICATIONS?: NO

## 2025-09-01 ASSESSMENT — PATIENT HEALTH QUESTIONNAIRE - PHQ9
1. LITTLE INTEREST OR PLEASURE IN DOING THINGS: NOT AT ALL
SUM OF ALL RESPONSES TO PHQ QUESTIONS 1-9: 0
2. FEELING DOWN, DEPRESSED OR HOPELESS: NOT AT ALL
SUM OF ALL RESPONSES TO PHQ QUESTIONS 1-9: 0

## 2025-09-01 ASSESSMENT — LIFESTYLE VARIABLES
HOW OFTEN DO YOU HAVE A DRINK CONTAINING ALCOHOL: 3
HOW MANY STANDARD DRINKS CONTAINING ALCOHOL DO YOU HAVE ON A TYPICAL DAY: 1 OR 2
HOW MANY STANDARD DRINKS CONTAINING ALCOHOL DO YOU HAVE ON A TYPICAL DAY: 1
HOW OFTEN DO YOU HAVE SIX OR MORE DRINKS ON ONE OCCASION: 1
HOW OFTEN DO YOU HAVE A DRINK CONTAINING ALCOHOL: 2-4 TIMES A MONTH

## 2025-09-03 ENCOUNTER — OFFICE VISIT (OUTPATIENT)
Dept: FAMILY MEDICINE CLINIC | Age: 80
End: 2025-09-03

## 2025-09-03 VITALS
WEIGHT: 202 LBS | OXYGEN SATURATION: 95 % | HEIGHT: 70 IN | TEMPERATURE: 97.9 F | BODY MASS INDEX: 28.92 KG/M2 | DIASTOLIC BLOOD PRESSURE: 76 MMHG | HEART RATE: 84 BPM | SYSTOLIC BLOOD PRESSURE: 128 MMHG

## 2025-09-03 DIAGNOSIS — E78.2 MIXED HYPERLIPIDEMIA: ICD-10-CM

## 2025-09-03 DIAGNOSIS — Z00.00 MEDICARE ANNUAL WELLNESS VISIT, SUBSEQUENT: Primary | ICD-10-CM

## 2025-09-03 DIAGNOSIS — M79.675 PAIN OF LEFT GREAT TOE: ICD-10-CM

## 2025-09-03 DIAGNOSIS — J01.40 ACUTE PANSINUSITIS, RECURRENCE NOT SPECIFIED: ICD-10-CM

## 2025-09-03 DIAGNOSIS — R73.01 ELEVATED FASTING GLUCOSE: ICD-10-CM

## 2025-09-03 RX ORDER — ROSUVASTATIN CALCIUM 10 MG/1
10 TABLET, COATED ORAL NIGHTLY
Qty: 90 TABLET | Refills: 2 | Status: SHIPPED | OUTPATIENT
Start: 2025-09-03

## 2025-09-03 RX ORDER — CEFUROXIME AXETIL 250 MG/1
250 TABLET ORAL 2 TIMES DAILY
Qty: 20 TABLET | Refills: 0 | Status: SHIPPED | OUTPATIENT
Start: 2025-09-03 | End: 2025-09-13

## 2025-09-04 DIAGNOSIS — E78.2 MIXED HYPERLIPIDEMIA: ICD-10-CM

## 2025-09-04 RX ORDER — ROSUVASTATIN CALCIUM 10 MG/1
10 TABLET, COATED ORAL NIGHTLY
Qty: 90 TABLET | Refills: 2 | OUTPATIENT
Start: 2025-09-04